# Patient Record
Sex: FEMALE | Race: WHITE | Employment: OTHER | URBAN - METROPOLITAN AREA
[De-identification: names, ages, dates, MRNs, and addresses within clinical notes are randomized per-mention and may not be internally consistent; named-entity substitution may affect disease eponyms.]

---

## 2017-06-09 ENCOUNTER — NURSE ONLY (OUTPATIENT)
Dept: INTERNAL MEDICINE CLINIC | Age: 82
End: 2017-06-09

## 2017-06-09 ENCOUNTER — TELEPHONE (OUTPATIENT)
Dept: INTERNAL MEDICINE CLINIC | Age: 82
End: 2017-06-09

## 2017-06-09 DIAGNOSIS — Z00.00 ROUTINE GENERAL MEDICAL EXAMINATION AT A HEALTH CARE FACILITY: ICD-10-CM

## 2017-06-09 DIAGNOSIS — I10 ESSENTIAL HYPERTENSION: ICD-10-CM

## 2017-06-09 DIAGNOSIS — Z00.00 ROUTINE GENERAL MEDICAL EXAMINATION AT A HEALTH CARE FACILITY: Primary | ICD-10-CM

## 2017-06-09 DIAGNOSIS — E78.5 HYPERLIPIDEMIA, UNSPECIFIED HYPERLIPIDEMIA TYPE: ICD-10-CM

## 2017-06-09 LAB
A/G RATIO: 2 (ref 1.1–2.2)
ALBUMIN SERPL-MCNC: 4.4 G/DL (ref 3.4–5)
ALP BLD-CCNC: 95 U/L (ref 40–129)
ALT SERPL-CCNC: 12 U/L (ref 10–40)
ANION GAP SERPL CALCULATED.3IONS-SCNC: 13 MMOL/L (ref 3–16)
AST SERPL-CCNC: 27 U/L (ref 15–37)
BILIRUB SERPL-MCNC: 0.8 MG/DL (ref 0–1)
BUN BLDV-MCNC: 22 MG/DL (ref 7–20)
CALCIUM SERPL-MCNC: 9.9 MG/DL (ref 8.3–10.6)
CHLORIDE BLD-SCNC: 101 MMOL/L (ref 99–110)
CHOLESTEROL, TOTAL: 152 MG/DL (ref 0–199)
CO2: 27 MMOL/L (ref 21–32)
CREAT SERPL-MCNC: 0.6 MG/DL (ref 0.6–1.2)
GFR AFRICAN AMERICAN: >60
GFR NON-AFRICAN AMERICAN: >60
GLOBULIN: 2.2 G/DL
GLUCOSE BLD-MCNC: 100 MG/DL (ref 70–99)
HCT VFR BLD CALC: 42.9 % (ref 36–48)
HDLC SERPL-MCNC: 68 MG/DL (ref 40–60)
HEMOGLOBIN: 14.2 G/DL (ref 12–16)
LDL CHOLESTEROL CALCULATED: 68 MG/DL
MCH RBC QN AUTO: 30 PG (ref 26–34)
MCHC RBC AUTO-ENTMCNC: 33.1 G/DL (ref 31–36)
MCV RBC AUTO: 90.8 FL (ref 80–100)
PDW BLD-RTO: 13.2 % (ref 12.4–15.4)
PLATELET # BLD: 183 K/UL (ref 135–450)
PMV BLD AUTO: 9.7 FL (ref 5–10.5)
POTASSIUM SERPL-SCNC: 4 MMOL/L (ref 3.5–5.1)
RBC # BLD: 4.73 M/UL (ref 4–5.2)
SODIUM BLD-SCNC: 141 MMOL/L (ref 136–145)
TOTAL PROTEIN: 6.6 G/DL (ref 6.4–8.2)
TRIGL SERPL-MCNC: 82 MG/DL (ref 0–150)
TSH SERPL DL<=0.05 MIU/L-ACNC: 1.93 UIU/ML (ref 0.27–4.2)
VLDLC SERPL CALC-MCNC: 16 MG/DL
WBC # BLD: 6 K/UL (ref 4–11)

## 2017-06-09 PROCEDURE — 36415 COLL VENOUS BLD VENIPUNCTURE: CPT | Performed by: INTERNAL MEDICINE

## 2017-06-12 ENCOUNTER — OFFICE VISIT (OUTPATIENT)
Dept: INTERNAL MEDICINE CLINIC | Age: 82
End: 2017-06-12

## 2017-06-12 VITALS
WEIGHT: 128 LBS | SYSTOLIC BLOOD PRESSURE: 138 MMHG | TEMPERATURE: 97.6 F | DIASTOLIC BLOOD PRESSURE: 80 MMHG | HEART RATE: 68 BPM | BODY MASS INDEX: 25 KG/M2

## 2017-06-12 DIAGNOSIS — E04.9 GOITER: ICD-10-CM

## 2017-06-12 DIAGNOSIS — I10 ESSENTIAL HYPERTENSION: ICD-10-CM

## 2017-06-12 DIAGNOSIS — E78.00 PURE HYPERCHOLESTEROLEMIA: Primary | ICD-10-CM

## 2017-06-12 PROCEDURE — 1090F PRES/ABSN URINE INCON ASSESS: CPT | Performed by: INTERNAL MEDICINE

## 2017-06-12 PROCEDURE — G8427 DOCREV CUR MEDS BY ELIG CLIN: HCPCS | Performed by: INTERNAL MEDICINE

## 2017-06-12 PROCEDURE — G8400 PT W/DXA NO RESULTS DOC: HCPCS | Performed by: INTERNAL MEDICINE

## 2017-06-12 PROCEDURE — 99214 OFFICE O/P EST MOD 30 MIN: CPT | Performed by: INTERNAL MEDICINE

## 2017-06-12 PROCEDURE — 1036F TOBACCO NON-USER: CPT | Performed by: INTERNAL MEDICINE

## 2017-06-12 PROCEDURE — G8419 CALC BMI OUT NRM PARAM NOF/U: HCPCS | Performed by: INTERNAL MEDICINE

## 2017-06-12 PROCEDURE — 1123F ACP DISCUSS/DSCN MKR DOCD: CPT | Performed by: INTERNAL MEDICINE

## 2017-06-12 PROCEDURE — 4040F PNEUMOC VAC/ADMIN/RCVD: CPT | Performed by: INTERNAL MEDICINE

## 2017-06-12 RX ORDER — ATORVASTATIN CALCIUM 20 MG/1
20 TABLET, FILM COATED ORAL DAILY
Qty: 30 TABLET | Refills: 11 | Status: SHIPPED | OUTPATIENT
Start: 2017-06-12 | End: 2018-06-11 | Stop reason: SDUPTHER

## 2017-06-12 RX ORDER — LISINOPRIL AND HYDROCHLOROTHIAZIDE 25; 20 MG/1; MG/1
1 TABLET ORAL DAILY
Qty: 90 TABLET | Refills: 3 | Status: SHIPPED | OUTPATIENT
Start: 2017-06-12 | End: 2018-06-11 | Stop reason: SDUPTHER

## 2017-06-12 ASSESSMENT — ENCOUNTER SYMPTOMS
COLOR CHANGE: 0
STRIDOR: 0
EYES NEGATIVE: 1
CHOKING: 0
RESPIRATORY NEGATIVE: 1
SINUS PRESSURE: 0
APNEA: 0
BACK PAIN: 0

## 2017-10-20 ENCOUNTER — NURSE ONLY (OUTPATIENT)
Dept: INTERNAL MEDICINE CLINIC | Age: 82
End: 2017-10-20

## 2017-10-20 DIAGNOSIS — Z23 NEED FOR INFLUENZA VACCINATION: Primary | ICD-10-CM

## 2017-10-20 PROCEDURE — G0008 ADMIN INFLUENZA VIRUS VAC: HCPCS | Performed by: INTERNAL MEDICINE

## 2017-10-20 PROCEDURE — 90662 IIV NO PRSV INCREASED AG IM: CPT | Performed by: INTERNAL MEDICINE

## 2018-06-08 ENCOUNTER — TELEPHONE (OUTPATIENT)
Dept: INTERNAL MEDICINE CLINIC | Age: 83
End: 2018-06-08

## 2018-06-08 ENCOUNTER — NURSE ONLY (OUTPATIENT)
Dept: INTERNAL MEDICINE CLINIC | Age: 83
End: 2018-06-08

## 2018-06-08 DIAGNOSIS — I10 ESSENTIAL HYPERTENSION: ICD-10-CM

## 2018-06-08 DIAGNOSIS — E78.00 PURE HYPERCHOLESTEROLEMIA: Primary | ICD-10-CM

## 2018-06-08 LAB
A/G RATIO: 1.7 (ref 1.1–2.2)
ALBUMIN SERPL-MCNC: 4.1 G/DL (ref 3.4–5)
ALP BLD-CCNC: 99 U/L (ref 40–129)
ALT SERPL-CCNC: 9 U/L (ref 10–40)
ANION GAP SERPL CALCULATED.3IONS-SCNC: 12 MMOL/L (ref 3–16)
AST SERPL-CCNC: 21 U/L (ref 15–37)
BILIRUB SERPL-MCNC: 0.5 MG/DL (ref 0–1)
BUN BLDV-MCNC: 19 MG/DL (ref 7–20)
CALCIUM SERPL-MCNC: 10 MG/DL (ref 8.3–10.6)
CHLORIDE BLD-SCNC: 103 MMOL/L (ref 99–110)
CHOLESTEROL, TOTAL: 152 MG/DL (ref 0–199)
CO2: 27 MMOL/L (ref 21–32)
CREAT SERPL-MCNC: 0.6 MG/DL (ref 0.6–1.2)
GFR AFRICAN AMERICAN: >60
GFR NON-AFRICAN AMERICAN: >60
GLOBULIN: 2.4 G/DL
GLUCOSE BLD-MCNC: 96 MG/DL (ref 70–99)
HCT VFR BLD CALC: 41.7 % (ref 36–48)
HDLC SERPL-MCNC: 69 MG/DL (ref 40–60)
HEMOGLOBIN: 13.9 G/DL (ref 12–16)
LDL CHOLESTEROL CALCULATED: 68 MG/DL
MCH RBC QN AUTO: 30.7 PG (ref 26–34)
MCHC RBC AUTO-ENTMCNC: 33.3 G/DL (ref 31–36)
MCV RBC AUTO: 92.4 FL (ref 80–100)
PDW BLD-RTO: 13.7 % (ref 12.4–15.4)
PLATELET # BLD: 217 K/UL (ref 135–450)
PMV BLD AUTO: 9.3 FL (ref 5–10.5)
POTASSIUM SERPL-SCNC: 3.9 MMOL/L (ref 3.5–5.1)
RBC # BLD: 4.52 M/UL (ref 4–5.2)
SODIUM BLD-SCNC: 142 MMOL/L (ref 136–145)
TOTAL PROTEIN: 6.5 G/DL (ref 6.4–8.2)
TRIGL SERPL-MCNC: 76 MG/DL (ref 0–150)
TSH SERPL DL<=0.05 MIU/L-ACNC: 2.08 UIU/ML (ref 0.27–4.2)
VLDLC SERPL CALC-MCNC: 15 MG/DL
WBC # BLD: 6.4 K/UL (ref 4–11)

## 2018-06-08 PROCEDURE — 36415 COLL VENOUS BLD VENIPUNCTURE: CPT | Performed by: INTERNAL MEDICINE

## 2018-06-11 ENCOUNTER — OFFICE VISIT (OUTPATIENT)
Dept: INTERNAL MEDICINE CLINIC | Age: 83
End: 2018-06-11

## 2018-06-11 VITALS
BODY MASS INDEX: 25.52 KG/M2 | HEART RATE: 66 BPM | TEMPERATURE: 98.3 F | SYSTOLIC BLOOD PRESSURE: 122 MMHG | HEIGHT: 60 IN | WEIGHT: 130 LBS | DIASTOLIC BLOOD PRESSURE: 80 MMHG

## 2018-06-11 DIAGNOSIS — I10 ESSENTIAL HYPERTENSION: Primary | ICD-10-CM

## 2018-06-11 DIAGNOSIS — E78.00 PURE HYPERCHOLESTEROLEMIA: ICD-10-CM

## 2018-06-11 PROCEDURE — 1123F ACP DISCUSS/DSCN MKR DOCD: CPT | Performed by: INTERNAL MEDICINE

## 2018-06-11 PROCEDURE — 99214 OFFICE O/P EST MOD 30 MIN: CPT | Performed by: INTERNAL MEDICINE

## 2018-06-11 PROCEDURE — 1090F PRES/ABSN URINE INCON ASSESS: CPT | Performed by: INTERNAL MEDICINE

## 2018-06-11 PROCEDURE — G8400 PT W/DXA NO RESULTS DOC: HCPCS | Performed by: INTERNAL MEDICINE

## 2018-06-11 PROCEDURE — G8427 DOCREV CUR MEDS BY ELIG CLIN: HCPCS | Performed by: INTERNAL MEDICINE

## 2018-06-11 PROCEDURE — G8417 CALC BMI ABV UP PARAM F/U: HCPCS | Performed by: INTERNAL MEDICINE

## 2018-06-11 PROCEDURE — 1036F TOBACCO NON-USER: CPT | Performed by: INTERNAL MEDICINE

## 2018-06-11 PROCEDURE — 4040F PNEUMOC VAC/ADMIN/RCVD: CPT | Performed by: INTERNAL MEDICINE

## 2018-06-11 RX ORDER — ATORVASTATIN CALCIUM 20 MG/1
20 TABLET, FILM COATED ORAL DAILY
Qty: 90 TABLET | Refills: 3 | Status: SHIPPED | OUTPATIENT
Start: 2018-06-11 | End: 2019-05-28 | Stop reason: SDUPTHER

## 2018-06-11 RX ORDER — LISINOPRIL AND HYDROCHLOROTHIAZIDE 25; 20 MG/1; MG/1
1 TABLET ORAL DAILY
Qty: 90 TABLET | Refills: 3 | Status: SHIPPED | OUTPATIENT
Start: 2018-06-11 | End: 2019-05-28 | Stop reason: SDUPTHER

## 2018-06-11 ASSESSMENT — ENCOUNTER SYMPTOMS
GASTROINTESTINAL NEGATIVE: 1
RESPIRATORY NEGATIVE: 1

## 2018-06-11 ASSESSMENT — PATIENT HEALTH QUESTIONNAIRE - PHQ9
2. FEELING DOWN, DEPRESSED OR HOPELESS: 0
1. LITTLE INTEREST OR PLEASURE IN DOING THINGS: 0
SUM OF ALL RESPONSES TO PHQ9 QUESTIONS 1 & 2: 0
SUM OF ALL RESPONSES TO PHQ QUESTIONS 1-9: 0

## 2018-10-23 ENCOUNTER — IMMUNIZATION (OUTPATIENT)
Dept: INTERNAL MEDICINE CLINIC | Age: 83
End: 2018-10-23
Payer: MEDICARE

## 2018-10-23 DIAGNOSIS — Z23 NEED FOR INFLUENZA VACCINATION: Primary | ICD-10-CM

## 2018-10-23 PROCEDURE — 90662 IIV NO PRSV INCREASED AG IM: CPT | Performed by: INTERNAL MEDICINE

## 2018-10-23 PROCEDURE — G0008 ADMIN INFLUENZA VIRUS VAC: HCPCS | Performed by: INTERNAL MEDICINE

## 2018-12-03 ENCOUNTER — TELEPHONE (OUTPATIENT)
Dept: INTERNAL MEDICINE CLINIC | Age: 83
End: 2018-12-03

## 2018-12-04 ENCOUNTER — OFFICE VISIT (OUTPATIENT)
Dept: INTERNAL MEDICINE CLINIC | Age: 83
End: 2018-12-04
Payer: MEDICARE

## 2018-12-04 VITALS
HEART RATE: 92 BPM | DIASTOLIC BLOOD PRESSURE: 80 MMHG | BODY MASS INDEX: 25.97 KG/M2 | SYSTOLIC BLOOD PRESSURE: 122 MMHG | WEIGHT: 133 LBS | TEMPERATURE: 98.3 F | OXYGEN SATURATION: 96 %

## 2018-12-04 DIAGNOSIS — J06.9 ACUTE URI: Primary | ICD-10-CM

## 2018-12-04 PROCEDURE — 1036F TOBACCO NON-USER: CPT | Performed by: INTERNAL MEDICINE

## 2018-12-04 PROCEDURE — G8482 FLU IMMUNIZE ORDER/ADMIN: HCPCS | Performed by: INTERNAL MEDICINE

## 2018-12-04 PROCEDURE — 99213 OFFICE O/P EST LOW 20 MIN: CPT | Performed by: INTERNAL MEDICINE

## 2018-12-04 PROCEDURE — 1090F PRES/ABSN URINE INCON ASSESS: CPT | Performed by: INTERNAL MEDICINE

## 2018-12-04 PROCEDURE — 4040F PNEUMOC VAC/ADMIN/RCVD: CPT | Performed by: INTERNAL MEDICINE

## 2018-12-04 PROCEDURE — G8427 DOCREV CUR MEDS BY ELIG CLIN: HCPCS | Performed by: INTERNAL MEDICINE

## 2018-12-04 PROCEDURE — 1123F ACP DISCUSS/DSCN MKR DOCD: CPT | Performed by: INTERNAL MEDICINE

## 2018-12-04 PROCEDURE — 1101F PT FALLS ASSESS-DOCD LE1/YR: CPT | Performed by: INTERNAL MEDICINE

## 2018-12-04 PROCEDURE — G8400 PT W/DXA NO RESULTS DOC: HCPCS | Performed by: INTERNAL MEDICINE

## 2018-12-04 PROCEDURE — G8417 CALC BMI ABV UP PARAM F/U: HCPCS | Performed by: INTERNAL MEDICINE

## 2018-12-04 RX ORDER — LEVOFLOXACIN 250 MG/1
250 TABLET ORAL DAILY
Qty: 7 TABLET | Refills: 0 | Status: SHIPPED | OUTPATIENT
Start: 2018-12-04 | End: 2018-12-11

## 2018-12-04 RX ORDER — GUAIFENESIN AND CODEINE PHOSPHATE 100; 10 MG/5ML; MG/5ML
10 SOLUTION ORAL 3 TIMES DAILY PRN
Qty: 120 ML | Refills: 0 | Status: SHIPPED | OUTPATIENT
Start: 2018-12-04 | End: 2018-12-11

## 2018-12-04 ASSESSMENT — ENCOUNTER SYMPTOMS
WHEEZING: 0
SHORTNESS OF BREATH: 0
RHINORRHEA: 1
VOICE CHANGE: 1
COUGH: 1
GASTROINTESTINAL NEGATIVE: 1

## 2018-12-04 NOTE — PATIENT INSTRUCTIONS
Please call your pharmacy if you need any refills of your medication(s). Please call our office at [phone] if you don't hear from us about your test results. Bring an accurate list of your medications with you at every appointment to ensure that we have the correct information.     Our office hours are: Monday - Friday 8:30 am- 5 pm    Phone lines turn on at 8:30 am

## 2018-12-04 NOTE — PROGRESS NOTES
Smoker                                                              Smokeless tobacco: Never Used                      Alcohol use: No                  Cough   This is a new problem. The current episode started 1 to 4 weeks ago. The problem has been gradually worsening. The problem occurs constantly. The cough is productive of brown sputum. Associated symptoms include myalgias and rhinorrhea. Pertinent negatives include no chills, ear pain, shortness of breath or wheezing. Nothing aggravates the symptoms. She has tried OTC cough suppressant for the symptoms. The treatment provided no relief. There is no history of asthma or pneumonia. Review of Systems   Constitutional: Negative for chills. HENT: Positive for rhinorrhea and voice change. Negative for ear pain. Respiratory: Positive for cough. Negative for shortness of breath and wheezing. Cardiovascular: Negative. Gastrointestinal: Negative. Genitourinary: Positive for frequency. Musculoskeletal: Positive for myalgias. Skin: Negative. Neurological:        Facial pain. Psychiatric/Behavioral: Positive for sleep disturbance. Objective:   Physical Exam   Constitutional: She is oriented to person, place, and time. She appears well-developed and well-nourished. No distress. HENT:   Head: Normocephalic and atraumatic. Right Ear: External ear normal.   Left Ear: External ear normal.   Nose: Nose normal.   Mouth/Throat: Oropharynx is clear and moist. No oropharyngeal exudate. Rt side enlarged nodule thyroid. Eyes: Pupils are equal, round, and reactive to light. Conjunctivae and EOM are normal. Right eye exhibits no discharge. Left eye exhibits no discharge. No scleral icterus. Neck: Normal range of motion. Neck supple. No JVD present. No tracheal deviation present. No thyromegaly present. Cardiovascular: Normal rate, regular rhythm, normal heart sounds and intact distal pulses. Exam reveals no gallop and no friction rub.     No murmur heard. Systolic heart murmur. Pulmonary/Chest: Effort normal and breath sounds normal. No stridor. No respiratory distress. She has no wheezes. She has no rales. She exhibits no tenderness. Abdominal: Soft. She exhibits no distension and no mass. There is no tenderness. There is no rebound and no guarding. Genitourinary: Vagina normal. Rectal exam shows guaiac negative stool. No vaginal discharge found. Musculoskeletal: Normal range of motion. She exhibits no edema or tenderness. Lymphadenopathy:     She has no cervical adenopathy. Neurological: She is alert and oriented to person, place, and time. She has normal reflexes. She displays normal reflexes. No cranial nerve deficit. She exhibits normal muscle tone. Coordination normal.   Skin: Skin is warm and dry. No rash noted. She is not diaphoretic. No erythema. No pallor. Psychiatric: She has a normal mood and affect. Her behavior is normal. Judgment and thought content normal.       Assessment:      Encounter Diagnosis   Name Primary?  Acute URI Yes           Plan:      Esteban Nj was seen today for cough. Diagnoses and all orders for this visit:    Acute URI  -     levofloxacin (LEVAQUIN) 250 MG tablet; Take 1 tablet by mouth daily for 7 days  -     guaiFENesin-codeine (TUSSI-ORGANIDIN NR) 100-10 MG/5ML syrup; Take 10 mLs by mouth 3 times daily as needed for Cough for up to 7 days. Jean Sherman MD

## 2019-05-28 ENCOUNTER — TELEPHONE (OUTPATIENT)
Dept: INTERNAL MEDICINE CLINIC | Age: 84
End: 2019-05-28

## 2019-05-28 DIAGNOSIS — I10 ESSENTIAL HYPERTENSION: ICD-10-CM

## 2019-05-28 DIAGNOSIS — E78.00 PURE HYPERCHOLESTEROLEMIA: ICD-10-CM

## 2019-05-28 RX ORDER — LISINOPRIL AND HYDROCHLOROTHIAZIDE 25; 20 MG/1; MG/1
1 TABLET ORAL DAILY
Qty: 90 TABLET | Refills: 3 | Status: SHIPPED | OUTPATIENT
Start: 2019-05-28 | End: 2019-06-13 | Stop reason: SDUPTHER

## 2019-05-28 RX ORDER — ATORVASTATIN CALCIUM 20 MG/1
20 TABLET, FILM COATED ORAL DAILY
Qty: 90 TABLET | Refills: 3 | Status: SHIPPED | OUTPATIENT
Start: 2019-05-28 | End: 2019-06-13 | Stop reason: SDUPTHER

## 2019-06-10 ENCOUNTER — TELEPHONE (OUTPATIENT)
Dept: INTERNAL MEDICINE CLINIC | Age: 84
End: 2019-06-10

## 2019-06-10 DIAGNOSIS — E78.00 PURE HYPERCHOLESTEROLEMIA: Primary | ICD-10-CM

## 2019-06-10 DIAGNOSIS — Z00.00 ANNUAL PHYSICAL EXAM: ICD-10-CM

## 2019-06-10 DIAGNOSIS — E78.00 PURE HYPERCHOLESTEROLEMIA: ICD-10-CM

## 2019-06-10 DIAGNOSIS — I10 ESSENTIAL HYPERTENSION: ICD-10-CM

## 2019-06-10 LAB
A/G RATIO: 1.5 (ref 1.1–2.2)
ALBUMIN SERPL-MCNC: 4.3 G/DL (ref 3.4–5)
ALP BLD-CCNC: 109 U/L (ref 40–129)
ALT SERPL-CCNC: 8 U/L (ref 10–40)
ANION GAP SERPL CALCULATED.3IONS-SCNC: 10 MMOL/L (ref 3–16)
AST SERPL-CCNC: 22 U/L (ref 15–37)
BILIRUB SERPL-MCNC: 0.7 MG/DL (ref 0–1)
BUN BLDV-MCNC: 20 MG/DL (ref 7–20)
CALCIUM SERPL-MCNC: 10.3 MG/DL (ref 8.3–10.6)
CHLORIDE BLD-SCNC: 101 MMOL/L (ref 99–110)
CHOLESTEROL, TOTAL: 154 MG/DL (ref 0–199)
CO2: 29 MMOL/L (ref 21–32)
CREAT SERPL-MCNC: 0.6 MG/DL (ref 0.6–1.2)
GFR AFRICAN AMERICAN: >60
GFR NON-AFRICAN AMERICAN: >60
GLOBULIN: 2.8 G/DL
GLUCOSE BLD-MCNC: 104 MG/DL (ref 70–99)
HCT VFR BLD CALC: 45.5 % (ref 36–48)
HDLC SERPL-MCNC: 64 MG/DL (ref 40–60)
HEMOGLOBIN: 15 G/DL (ref 12–16)
LDL CHOLESTEROL CALCULATED: 74 MG/DL
MCH RBC QN AUTO: 30 PG (ref 26–34)
MCHC RBC AUTO-ENTMCNC: 33 G/DL (ref 31–36)
MCV RBC AUTO: 91 FL (ref 80–100)
PDW BLD-RTO: 13.4 % (ref 12.4–15.4)
PLATELET # BLD: 205 K/UL (ref 135–450)
PMV BLD AUTO: 9.7 FL (ref 5–10.5)
POTASSIUM SERPL-SCNC: 4.4 MMOL/L (ref 3.5–5.1)
RBC # BLD: 5 M/UL (ref 4–5.2)
SODIUM BLD-SCNC: 140 MMOL/L (ref 136–145)
TOTAL PROTEIN: 7.1 G/DL (ref 6.4–8.2)
TRIGL SERPL-MCNC: 82 MG/DL (ref 0–150)
TSH SERPL DL<=0.05 MIU/L-ACNC: 2.82 UIU/ML (ref 0.27–4.2)
VLDLC SERPL CALC-MCNC: 16 MG/DL
WBC # BLD: 6.7 K/UL (ref 4–11)

## 2019-06-13 ENCOUNTER — OFFICE VISIT (OUTPATIENT)
Dept: INTERNAL MEDICINE CLINIC | Age: 84
End: 2019-06-13
Payer: MEDICARE

## 2019-06-13 VITALS
OXYGEN SATURATION: 96 % | SYSTOLIC BLOOD PRESSURE: 132 MMHG | WEIGHT: 135.25 LBS | BODY MASS INDEX: 26.55 KG/M2 | HEART RATE: 84 BPM | DIASTOLIC BLOOD PRESSURE: 82 MMHG | TEMPERATURE: 98 F | HEIGHT: 60 IN

## 2019-06-13 DIAGNOSIS — E78.00 PURE HYPERCHOLESTEROLEMIA: Primary | ICD-10-CM

## 2019-06-13 DIAGNOSIS — I10 ESSENTIAL HYPERTENSION: ICD-10-CM

## 2019-06-13 DIAGNOSIS — Z00.00 ANNUAL PHYSICAL EXAM: ICD-10-CM

## 2019-06-13 PROCEDURE — 4040F PNEUMOC VAC/ADMIN/RCVD: CPT | Performed by: INTERNAL MEDICINE

## 2019-06-13 PROCEDURE — 1090F PRES/ABSN URINE INCON ASSESS: CPT | Performed by: INTERNAL MEDICINE

## 2019-06-13 PROCEDURE — 1036F TOBACCO NON-USER: CPT | Performed by: INTERNAL MEDICINE

## 2019-06-13 PROCEDURE — G8400 PT W/DXA NO RESULTS DOC: HCPCS | Performed by: INTERNAL MEDICINE

## 2019-06-13 PROCEDURE — 1123F ACP DISCUSS/DSCN MKR DOCD: CPT | Performed by: INTERNAL MEDICINE

## 2019-06-13 PROCEDURE — 99214 OFFICE O/P EST MOD 30 MIN: CPT | Performed by: INTERNAL MEDICINE

## 2019-06-13 PROCEDURE — G8427 DOCREV CUR MEDS BY ELIG CLIN: HCPCS | Performed by: INTERNAL MEDICINE

## 2019-06-13 PROCEDURE — G8417 CALC BMI ABV UP PARAM F/U: HCPCS | Performed by: INTERNAL MEDICINE

## 2019-06-13 RX ORDER — LISINOPRIL AND HYDROCHLOROTHIAZIDE 25; 20 MG/1; MG/1
1 TABLET ORAL DAILY
Qty: 90 TABLET | Refills: 3 | Status: SHIPPED | OUTPATIENT
Start: 2019-06-13 | End: 2020-06-01 | Stop reason: SDUPTHER

## 2019-06-13 RX ORDER — ATORVASTATIN CALCIUM 20 MG/1
20 TABLET, FILM COATED ORAL DAILY
Qty: 90 TABLET | Refills: 3 | Status: SHIPPED | OUTPATIENT
Start: 2019-06-13 | End: 2020-06-01 | Stop reason: SDUPTHER

## 2019-06-13 ASSESSMENT — ENCOUNTER SYMPTOMS
STRIDOR: 0
GASTROINTESTINAL NEGATIVE: 1
EYES NEGATIVE: 1
SINUS PRESSURE: 0
CHOKING: 0
BACK PAIN: 0
RESPIRATORY NEGATIVE: 1
APNEA: 0
COLOR CHANGE: 0

## 2019-06-13 ASSESSMENT — PATIENT HEALTH QUESTIONNAIRE - PHQ9
SUM OF ALL RESPONSES TO PHQ QUESTIONS 1-9: 0
SUM OF ALL RESPONSES TO PHQ QUESTIONS 1-9: 0
SUM OF ALL RESPONSES TO PHQ9 QUESTIONS 1 & 2: 0
1. LITTLE INTEREST OR PLEASURE IN DOING THINGS: 0
2. FEELING DOWN, DEPRESSED OR HOPELESS: 0

## 2019-06-13 NOTE — PATIENT INSTRUCTIONS
Please call your pharmacy if you need any refills of your medication(s). Please call our office at [phone] if you don't hear from us about your test results. Bring an accurate list of your medications with you at every appointment to ensure that we have the correct information.     Our office hours are: Monday - Friday 8:30 am- 5 pm    Phone lines turn on at 8:30 am

## 2019-06-13 NOTE — PROGRESS NOTES
Subjective:      Patient ID: Myranda Wright is a 82 y.o. female. Patient presents with: hypertension and hyperlipidemia. Annual Exam: yearly check up , discuss labs. refill atorvastatin to [facility]. Myranda Wright is a 82 y.o. female with the following history as recorded in [facility]:  Patient Active Problem List    UGI bleed         Date Noted: 09/19/2014      Hyperlipidemia         Date Noted: 01/04/2012      HTN (hypertension)         Date Noted: 01/04/2012      Current Outpatient Medications:  atorvastatin (LIPITOR) 20 MG tablet, Take 1 tablet by mouth daily, Disp: 90 tablet, Rfl: 3  lisinopril-hydrochlorothiazide (PRINZIDE;ZESTORETIC) 20-25 MG per tablet, Take 1 tablet by mouth daily, Disp: 90 tablet, Rfl: 3  acetaminophen (TYLENOL) 325 MG tablet, Take 650 mg by mouth every 6 hours as needed for Pain (knees). , Disp: , Rfl:     No current facility-administered medications for this visit. Allergies: Aspirin; Demerol hcl (meperidine); and Sulfa antibiotics  Past Medical History:  9/2014: Anemia  No date: Bleeding stomach ulcer  9/2014: Hiatal hernia      Comment:  small  No date: Hyperlipidemia  No date: Hypertension  No date: Inguinal hernia      Comment:  no surgery yet.   No date: Kidney calculus  9/2014: Sigmoid diverticulosis  Past Surgical History:  age 33'mayuri: APPENDECTOMY  age 33'mayuri: HYSTERECTOMY  child: TONSILLECTOMY AND ADENOIDECTOMY  9/2014: UPPER GASTROINTESTINAL ENDOSCOPY      Comment:  bleeding stomach ulcer  Review of patient's family history indicates:  Problem: Heart Disease      Relation: Mother          Age of Onset: (Not Specified)  Problem: Diabetes      Relation: Mother          Age of Onset: (Not Specified)  Problem: Cancer      Relation: Mother          Age of Onset: (Not Specified)  Problem: Heart Disease      Relation: Father          Age of Onset: (Not Specified)  Problem: Breast Cancer      Relation: Sister          Age of Onset: (Not Specified)    Social History Tobacco Use      Smoking status: Never Smoker      Smokeless tobacco: Never Used    Alcohol use: No      Hypertension   This is a chronic problem. The current episode started more than 1 year ago. The problem is controlled. There are no associated agents to hypertension. Past treatments include ACE inhibitors and diuretics. The current treatment provides significant improvement. There are no compliance problems. There is no history of chronic renal disease or sleep apnea. Review of Systems   Constitutional: Negative. Negative for fatigue. HENT: Negative. Negative for ear pain, hearing loss and sinus pressure. Eyes: Negative. Negative for visual disturbance. Respiratory: Negative. Negative for apnea, choking and stridor. Cardiovascular: Negative. Gastrointestinal: Negative. Genitourinary: Negative. Negative for hematuria. Musculoskeletal: Positive for arthralgias. Negative for back pain. Knee pain. Skin: Negative. Negative for color change and pallor. Neurological: Negative. Negative for dizziness, tremors, seizures and syncope. Hematological: Does not bruise/bleed easily. Psychiatric/Behavioral: Negative. Negative for confusion, decreased concentration and dysphoric mood. Objective:   Physical Exam    Assessment:      Encounter Diagnoses   Name Primary?  Pure hypercholesterolemia Yes    Essential hypertension     Annual physical exam            Plan:      Tamiko Rodsa was seen today for annual exam.    Diagnoses and all orders for this visit:    Pure hypercholesterolemia  -     atorvastatin (LIPITOR) 20 MG tablet; Take 1 tablet by mouth daily    Essential hypertension  -     lisinopril-hydrochlorothiazide (PRINZIDE;ZESTORETIC) 20-25 MG per tablet;  Take 1 tablet by mouth daily    Annual physical exam                Karen Mathis MD

## 2019-10-03 ENCOUNTER — NURSE ONLY (OUTPATIENT)
Dept: INTERNAL MEDICINE CLINIC | Age: 84
End: 2019-10-03
Payer: MEDICARE

## 2019-10-03 DIAGNOSIS — Z23 NEED FOR INFLUENZA VACCINATION: Primary | ICD-10-CM

## 2019-10-03 PROCEDURE — G0008 ADMIN INFLUENZA VIRUS VAC: HCPCS | Performed by: INTERNAL MEDICINE

## 2019-10-03 PROCEDURE — 90653 IIV ADJUVANT VACCINE IM: CPT | Performed by: INTERNAL MEDICINE

## 2020-06-01 ENCOUNTER — VIRTUAL VISIT (OUTPATIENT)
Dept: FAMILY MEDICINE CLINIC | Age: 85
End: 2020-06-01

## 2020-06-01 VITALS — WEIGHT: 134 LBS | BODY MASS INDEX: 26.17 KG/M2

## 2020-06-01 PROCEDURE — 99442 PR PHYS/QHP TELEPHONE EVALUATION 11-20 MIN: CPT | Performed by: INTERNAL MEDICINE

## 2020-06-01 RX ORDER — LISINOPRIL AND HYDROCHLOROTHIAZIDE 25; 20 MG/1; MG/1
1 TABLET ORAL DAILY
Qty: 90 TABLET | Refills: 3 | Status: SHIPPED | OUTPATIENT
Start: 2020-06-01 | End: 2021-05-19 | Stop reason: SDUPTHER

## 2020-06-01 RX ORDER — ATORVASTATIN CALCIUM 20 MG/1
20 TABLET, FILM COATED ORAL DAILY
Qty: 90 TABLET | Refills: 3 | Status: SHIPPED | OUTPATIENT
Start: 2020-06-01 | End: 2021-05-19 | Stop reason: SDUPTHER

## 2020-06-01 ASSESSMENT — PATIENT HEALTH QUESTIONNAIRE - PHQ9
1. LITTLE INTEREST OR PLEASURE IN DOING THINGS: 0
SUM OF ALL RESPONSES TO PHQ QUESTIONS 1-9: 0
SUM OF ALL RESPONSES TO PHQ9 QUESTIONS 1 & 2: 0
2. FEELING DOWN, DEPRESSED OR HOPELESS: 0
SUM OF ALL RESPONSES TO PHQ QUESTIONS 1-9: 0

## 2020-06-01 ASSESSMENT — ENCOUNTER SYMPTOMS
RESPIRATORY NEGATIVE: 1
NAUSEA: 0

## 2020-06-01 NOTE — PROGRESS NOTES
History    Tobacco Use      Smoking status: Never Smoker      Smokeless tobacco: Never Used    Alcohol use: No  Arturo Nice A Minor is a 82 y.o. female evaluated via telephone on 6/1/2020. Consent:  She and/or health care decision maker is aware that that she may receive a bill for this telephone service, depending on her insurance coverage, and has provided verbal consent to proceed: Yes      Documentation:  I communicated with the patient and/or health care decision maker about annual check up. .   Details of this discussion including any medical advice provided: by me. I affirm this is a Patient Initiated Episode with a Patient who has not had a related appointment within my department in the past 7 days or scheduled within the next 24 hours. Patient identification was verified at the start of the visit: Yes    Total Time: minutes: 11-20 minutes    Note: not billable if this call serves to triage the patient into an appointment for the relevant concern      [employer] Overall         Review of Systems   Constitutional: Negative for chills and fever. HENT: Positive for sneezing. Eyes: Negative for visual disturbance. Respiratory: Negative. Cardiovascular: Negative. Gastrointestinal: Negative for nausea. Genitourinary: Negative. Negative for frequency. Musculoskeletal: Negative. Skin: Negative. Neurological: Negative. Hematological: Negative. Psychiatric/Behavioral: The patient is nervous/anxious. Objective:   Physical Exam    Assessment:      Encounter Diagnoses   Name Primary?  Pure hypercholesterolemia     Essential hypertension            Plan:      Arturo Nice was seen today for annual exam.    Diagnoses and all orders for this visit:    Pure hypercholesterolemia  -     atorvastatin (LIPITOR) 20 MG tablet; Take 1 tablet by mouth daily  -     Comprehensive Metabolic Panel; Future  -     CBC; Future  -     Lipid Panel; Future  -     TSH without Reflex;  Future    Essential hypertension  -     lisinopril-hydroCHLOROthiazide (PRINZIDE;ZESTORETIC) 20-25 MG per tablet; Take 1 tablet by mouth daily  -     Comprehensive Metabolic Panel; Future  -     CBC; Future  -     TSH without Reflex;  Future  -     Priscilla Torres MD

## 2020-06-02 DIAGNOSIS — I10 ESSENTIAL HYPERTENSION: ICD-10-CM

## 2020-06-02 DIAGNOSIS — E78.00 PURE HYPERCHOLESTEROLEMIA: ICD-10-CM

## 2020-06-02 LAB
A/G RATIO: 2 (ref 1.1–2.2)
ALBUMIN SERPL-MCNC: 4.3 G/DL (ref 3.4–5)
ALP BLD-CCNC: 97 U/L (ref 40–129)
ALT SERPL-CCNC: 10 U/L (ref 10–40)
ANION GAP SERPL CALCULATED.3IONS-SCNC: 14 MMOL/L (ref 3–16)
AST SERPL-CCNC: 23 U/L (ref 15–37)
BILIRUB SERPL-MCNC: 0.7 MG/DL (ref 0–1)
BILIRUBIN URINE: NEGATIVE
BLOOD, URINE: ABNORMAL
BUN BLDV-MCNC: 21 MG/DL (ref 7–20)
CALCIUM SERPL-MCNC: 9.6 MG/DL (ref 8.3–10.6)
CHLORIDE BLD-SCNC: 100 MMOL/L (ref 99–110)
CHOLESTEROL, TOTAL: 163 MG/DL (ref 0–199)
CLARITY: CLEAR
CO2: 24 MMOL/L (ref 21–32)
COLOR: YELLOW
CREAT SERPL-MCNC: 0.6 MG/DL (ref 0.6–1.2)
EPITHELIAL CELLS, UA: 0 /HPF (ref 0–5)
GFR AFRICAN AMERICAN: >60
GFR NON-AFRICAN AMERICAN: >60
GLOBULIN: 2.2 G/DL
GLUCOSE BLD-MCNC: 99 MG/DL (ref 70–99)
GLUCOSE URINE: NEGATIVE MG/DL
HCT VFR BLD CALC: 44.4 % (ref 36–48)
HDLC SERPL-MCNC: 66 MG/DL (ref 40–60)
HEMOGLOBIN: 14.7 G/DL (ref 12–16)
HYALINE CASTS: 0 /LPF (ref 0–8)
KETONES, URINE: NEGATIVE MG/DL
LDL CHOLESTEROL CALCULATED: 79 MG/DL
LEUKOCYTE ESTERASE, URINE: NEGATIVE
MCH RBC QN AUTO: 30.3 PG (ref 26–34)
MCHC RBC AUTO-ENTMCNC: 33 G/DL (ref 31–36)
MCV RBC AUTO: 91.8 FL (ref 80–100)
MICROSCOPIC EXAMINATION: YES
NITRITE, URINE: NEGATIVE
PDW BLD-RTO: 13.5 % (ref 12.4–15.4)
PH UA: 6 (ref 5–8)
PLATELET # BLD: 201 K/UL (ref 135–450)
PMV BLD AUTO: 9.6 FL (ref 5–10.5)
POTASSIUM SERPL-SCNC: 3.7 MMOL/L (ref 3.5–5.1)
PROTEIN UA: NEGATIVE MG/DL
RBC # BLD: 4.84 M/UL (ref 4–5.2)
RBC UA: 3 /HPF (ref 0–4)
SODIUM BLD-SCNC: 138 MMOL/L (ref 136–145)
SPECIFIC GRAVITY UA: 1.02 (ref 1–1.03)
TOTAL PROTEIN: 6.5 G/DL (ref 6.4–8.2)
TRIGL SERPL-MCNC: 89 MG/DL (ref 0–150)
TSH SERPL DL<=0.05 MIU/L-ACNC: 3.53 UIU/ML (ref 0.27–4.2)
URINE TYPE: ABNORMAL
UROBILINOGEN, URINE: 0.2 E.U./DL
VLDLC SERPL CALC-MCNC: 18 MG/DL
WBC # BLD: 6 K/UL (ref 4–11)
WBC UA: 1 /HPF (ref 0–5)

## 2020-10-14 ENCOUNTER — IMMUNIZATION (OUTPATIENT)
Dept: FAMILY MEDICINE CLINIC | Age: 85
End: 2020-10-14
Payer: MEDICARE

## 2020-10-14 PROCEDURE — 90694 VACC AIIV4 NO PRSRV 0.5ML IM: CPT | Performed by: INTERNAL MEDICINE

## 2020-10-14 PROCEDURE — G0008 ADMIN INFLUENZA VIRUS VAC: HCPCS | Performed by: INTERNAL MEDICINE

## 2021-01-21 ENCOUNTER — IMMUNIZATION (OUTPATIENT)
Dept: PRIMARY CARE CLINIC | Age: 86
End: 2021-01-21
Payer: MEDICARE

## 2021-01-21 PROCEDURE — 91300 COVID-19, PFIZER VACCINE 30MCG/0.3ML DOSE: CPT | Performed by: NURSE PRACTITIONER

## 2021-01-21 PROCEDURE — 0001A PR IMM ADMN SARSCOV2 30MCG/0.3ML DIL RECON 1ST DOSE: CPT | Performed by: NURSE PRACTITIONER

## 2021-02-11 ENCOUNTER — IMMUNIZATION (OUTPATIENT)
Dept: PRIMARY CARE CLINIC | Age: 86
End: 2021-02-11
Payer: MEDICARE

## 2021-02-11 PROCEDURE — 0002A COVID-19, PFIZER VACCINE 30MCG/0.3ML DOSE: CPT | Performed by: FAMILY MEDICINE

## 2021-02-11 PROCEDURE — 91300 COVID-19, PFIZER VACCINE 30MCG/0.3ML DOSE: CPT | Performed by: FAMILY MEDICINE

## 2021-03-24 ENCOUNTER — TELEPHONE (OUTPATIENT)
Dept: FAMILY MEDICINE CLINIC | Age: 86
End: 2021-03-24

## 2021-03-24 NOTE — TELEPHONE ENCOUNTER
----- Message from Hillard Habermann sent at 3/24/2021 11:52 AM EDT -----  Subject: Appointment Request    Reason for Call: New Patient Request Appointment    QUESTIONS  Type of Appointment? Established Patient  Reason for appointment request? No appointments available during search  Additional Information for Provider? Patient needing to book to be   established for a new PCP before Dr Kerry Salmeron leaves and needing two refills for   current meds. ---------------------------------------------------------------------------  --------------  Qamar HOLGUIN  What is the best way for the office to contact you? OK to leave message on   voicemail  Preferred Call Back Phone Number? [phone]  ---------------------------------------------------------------------------  --------------  SCRIPT ANSWERS  Relationship to Patient? Self  Appointment reason? Establish Care/Find a provider  Is this the first appointment to establish care for a ? No  Have you been diagnosed with   tested for   or told that you are suspected of having COVID-19 (Coronavirus)? No  Have you had a fever or taken medication to treat a fever within the past   3 days? No  Have you had a cough   shortness of breath or flu-like symptoms within the past 3 days? No  Do you currently have flu-like symptoms including fever or chills   cough   shortness of breath   or difficulty breathing   or new loss of taste or smell? No  (Service Expert  click yes below to proceed with [employer] As Usual   Scheduling)?  Yes

## 2021-05-19 DIAGNOSIS — I10 ESSENTIAL HYPERTENSION: ICD-10-CM

## 2021-05-19 DIAGNOSIS — E78.00 PURE HYPERCHOLESTEROLEMIA: ICD-10-CM

## 2021-05-19 LAB
ANION GAP SERPL CALCULATED.3IONS-SCNC: 11 MMOL/L (ref 3–16)
BUN BLDV-MCNC: 21 MG/DL (ref 7–20)
CALCIUM SERPL-MCNC: 10.4 MG/DL (ref 8.3–10.6)
CHLORIDE BLD-SCNC: 101 MMOL/L (ref 99–110)
CHOLESTEROL, TOTAL: 164 MG/DL (ref 0–199)
CO2: 27 MMOL/L (ref 21–32)
CREAT SERPL-MCNC: 0.6 MG/DL (ref 0.6–1.2)
GFR AFRICAN AMERICAN: >60
GFR NON-AFRICAN AMERICAN: >60
GLUCOSE BLD-MCNC: 98 MG/DL (ref 70–99)
HDLC SERPL-MCNC: 63 MG/DL (ref 40–60)
LDL CHOLESTEROL CALCULATED: 78 MG/DL
POTASSIUM SERPL-SCNC: 4 MMOL/L (ref 3.5–5.1)
SODIUM BLD-SCNC: 139 MMOL/L (ref 136–145)
TRIGL SERPL-MCNC: 114 MG/DL (ref 0–150)
VLDLC SERPL CALC-MCNC: 23 MG/DL

## 2021-12-20 ENCOUNTER — HOSPITAL ENCOUNTER (OUTPATIENT)
Dept: WOMENS IMAGING | Age: 86
Discharge: HOME | End: 2021-12-20
Payer: MEDICARE

## 2021-12-20 DIAGNOSIS — Z13.820 SCREENING FOR OSTEOPOROSIS: ICD-10-CM

## 2021-12-20 DIAGNOSIS — M81.0 AGE-RELATED OSTEOPOROSIS WITHOUT CURRENT PATHOLOGICAL FRACTURE: ICD-10-CM

## 2021-12-20 DIAGNOSIS — M40.04 POSTURAL KYPHOSIS OF THORACIC REGION: ICD-10-CM

## 2021-12-20 PROCEDURE — 77080 DXA BONE DENSITY AXIAL: CPT

## 2022-01-03 ENCOUNTER — APPOINTMENT (OUTPATIENT)
Dept: CT IMAGING | Age: 87
DRG: 282 | End: 2022-01-03
Payer: MEDICARE

## 2022-01-03 ENCOUNTER — HOSPITAL ENCOUNTER (INPATIENT)
Age: 87
LOS: 2 days | Discharge: HOME | DRG: 282 | End: 2022-01-05
Attending: EMERGENCY MEDICINE
Payer: MEDICARE

## 2022-01-03 ENCOUNTER — APPOINTMENT (OUTPATIENT)
Dept: GENERAL RADIOLOGY | Age: 87
DRG: 282 | End: 2022-01-03
Payer: MEDICARE

## 2022-01-03 DIAGNOSIS — I21.4 NSTEMI (NON-ST ELEVATED MYOCARDIAL INFARCTION) (HCC): ICD-10-CM

## 2022-01-03 DIAGNOSIS — E83.42 HYPOMAGNESEMIA: ICD-10-CM

## 2022-01-03 DIAGNOSIS — I20.0 UNSTABLE ANGINA PECTORIS (HCC): Primary | ICD-10-CM

## 2022-01-03 DIAGNOSIS — I10 PRIMARY HYPERTENSION: ICD-10-CM

## 2022-01-03 PROBLEM — R79.89 ELEVATED TROPONIN: Status: ACTIVE | Noted: 2022-01-03

## 2022-01-03 PROBLEM — E07.9 THYROID MASS: Status: ACTIVE | Noted: 2022-01-03

## 2022-01-03 PROBLEM — R77.8 ELEVATED TROPONIN: Status: ACTIVE | Noted: 2022-01-03

## 2022-01-03 PROBLEM — R07.9 CHEST PAIN: Status: ACTIVE | Noted: 2022-01-03

## 2022-01-03 LAB
A/G RATIO: 1.9 (ref 1.1–2.2)
ALBUMIN SERPL-MCNC: 4.2 G/DL (ref 3.4–5)
ALP BLD-CCNC: 114 U/L (ref 40–129)
ALT SERPL-CCNC: 9 U/L (ref 10–40)
ANION GAP SERPL CALCULATED.3IONS-SCNC: 10 MMOL/L (ref 3–16)
AST SERPL-CCNC: 23 U/L (ref 15–37)
BASOPHILS ABSOLUTE: 0 K/UL (ref 0–0.2)
BASOPHILS RELATIVE PERCENT: 0.7 %
BILIRUB SERPL-MCNC: <0.2 MG/DL (ref 0–1)
BUN BLDV-MCNC: 31 MG/DL (ref 7–20)
CALCIUM SERPL-MCNC: 9.8 MG/DL (ref 8.3–10.6)
CHLORIDE BLD-SCNC: 106 MMOL/L (ref 99–110)
CO2: 26 MMOL/L (ref 21–32)
CREAT SERPL-MCNC: 1 MG/DL (ref 0.6–1.2)
EOSINOPHILS ABSOLUTE: 0.1 K/UL (ref 0–0.6)
EOSINOPHILS RELATIVE PERCENT: 1.2 %
GFR AFRICAN AMERICAN: >60
GFR NON-AFRICAN AMERICAN: 53
GLUCOSE BLD-MCNC: 133 MG/DL (ref 70–99)
HCT VFR BLD CALC: 42.7 % (ref 36–48)
HEMOGLOBIN: 14 G/DL (ref 12–16)
LIPASE: 34 U/L (ref 13–60)
LYMPHOCYTES ABSOLUTE: 1.9 K/UL (ref 1–5.1)
LYMPHOCYTES RELATIVE PERCENT: 26.5 %
MAGNESIUM: 1.5 MG/DL (ref 1.8–2.4)
MCH RBC QN AUTO: 29.7 PG (ref 26–34)
MCHC RBC AUTO-ENTMCNC: 32.9 G/DL (ref 31–36)
MCV RBC AUTO: 90.2 FL (ref 80–100)
MONOCYTES ABSOLUTE: 0.6 K/UL (ref 0–1.3)
MONOCYTES RELATIVE PERCENT: 8.8 %
NEUTROPHILS ABSOLUTE: 4.4 K/UL (ref 1.7–7.7)
NEUTROPHILS RELATIVE PERCENT: 62.8 %
PDW BLD-RTO: 12.4 % (ref 12.4–15.4)
PLATELET # BLD: 209 K/UL (ref 135–450)
PMV BLD AUTO: 8.9 FL (ref 5–10.5)
POTASSIUM REFLEX MAGNESIUM: 3.5 MMOL/L (ref 3.5–5.1)
RBC # BLD: 4.73 M/UL (ref 4–5.2)
SODIUM BLD-SCNC: 142 MMOL/L (ref 136–145)
TOTAL PROTEIN: 6.4 G/DL (ref 6.4–8.2)
TROPONIN: 0.04 NG/ML
TROPONIN: <0.01 NG/ML
WBC # BLD: 7 K/UL (ref 4–11)

## 2022-01-03 PROCEDURE — 6360000004 HC RX CONTRAST MEDICATION: Performed by: EMERGENCY MEDICINE

## 2022-01-03 PROCEDURE — 93005 ELECTROCARDIOGRAM TRACING: CPT | Performed by: EMERGENCY MEDICINE

## 2022-01-03 PROCEDURE — 85025 COMPLETE CBC W/AUTO DIFF WBC: CPT

## 2022-01-03 PROCEDURE — 71046 X-RAY EXAM CHEST 2 VIEWS: CPT

## 2022-01-03 PROCEDURE — 85730 THROMBOPLASTIN TIME PARTIAL: CPT

## 2022-01-03 PROCEDURE — 83690 ASSAY OF LIPASE: CPT

## 2022-01-03 PROCEDURE — 99285 EMERGENCY DEPT VISIT HI MDM: CPT

## 2022-01-03 PROCEDURE — 84443 ASSAY THYROID STIM HORMONE: CPT

## 2022-01-03 PROCEDURE — 6360000002 HC RX W HCPCS: Performed by: EMERGENCY MEDICINE

## 2022-01-03 PROCEDURE — 84484 ASSAY OF TROPONIN QUANT: CPT

## 2022-01-03 PROCEDURE — 80053 COMPREHEN METABOLIC PANEL: CPT

## 2022-01-03 PROCEDURE — 1200000000 HC SEMI PRIVATE

## 2022-01-03 PROCEDURE — 6370000000 HC RX 637 (ALT 250 FOR IP): Performed by: EMERGENCY MEDICINE

## 2022-01-03 PROCEDURE — 83735 ASSAY OF MAGNESIUM: CPT

## 2022-01-03 PROCEDURE — 36415 COLL VENOUS BLD VENIPUNCTURE: CPT

## 2022-01-03 PROCEDURE — 74174 CTA ABD&PLVS W/CONTRAST: CPT

## 2022-01-03 RX ORDER — HEPARIN SODIUM 1000 [USP'U]/ML
60 INJECTION, SOLUTION INTRAVENOUS; SUBCUTANEOUS PRN
Status: DISCONTINUED | OUTPATIENT
Start: 2022-01-03 | End: 2022-01-03

## 2022-01-03 RX ORDER — HEPARIN SODIUM 1000 [USP'U]/ML
3400 INJECTION, SOLUTION INTRAVENOUS; SUBCUTANEOUS ONCE
Status: COMPLETED | OUTPATIENT
Start: 2022-01-03 | End: 2022-01-03

## 2022-01-03 RX ORDER — HEPARIN SODIUM 1000 [USP'U]/ML
30 INJECTION, SOLUTION INTRAVENOUS; SUBCUTANEOUS PRN
Status: DISCONTINUED | OUTPATIENT
Start: 2022-01-03 | End: 2022-01-03

## 2022-01-03 RX ORDER — HEPARIN SODIUM AND DEXTROSE 10000; 5 [USP'U]/100ML; G/100ML
0-3000 INJECTION INTRAVENOUS CONTINUOUS
Status: DISCONTINUED | OUTPATIENT
Start: 2022-01-03 | End: 2022-01-04

## 2022-01-03 RX ORDER — CLOPIDOGREL BISULFATE 75 MG/1
300 TABLET ORAL ONCE
Status: COMPLETED | OUTPATIENT
Start: 2022-01-03 | End: 2022-01-03

## 2022-01-03 RX ADMIN — HEPARIN SODIUM 3400 UNITS: 1000 INJECTION INTRAVENOUS; SUBCUTANEOUS at 23:31

## 2022-01-03 RX ADMIN — CLOPIDOGREL BISULFATE 300 MG: 75 TABLET ORAL at 23:00

## 2022-01-03 RX ADMIN — IOPAMIDOL 100 ML: 755 INJECTION, SOLUTION INTRAVENOUS at 20:38

## 2022-01-03 RX ADMIN — HEPARIN SODIUM 680 UNITS/HR: 10000 INJECTION, SOLUTION INTRAVENOUS at 23:36

## 2022-01-03 ASSESSMENT — ENCOUNTER SYMPTOMS
NAUSEA: 0
SORE THROAT: 0
SHORTNESS OF BREATH: 0
VOMITING: 0
DIARRHEA: 0
ABDOMINAL DISTENTION: 0
RESPIRATORY NEGATIVE: 1

## 2022-01-03 ASSESSMENT — PAIN SCALES - GENERAL
PAINLEVEL_OUTOF10: 0

## 2022-01-04 PROBLEM — I21.4 NSTEMI (NON-ST ELEVATED MYOCARDIAL INFARCTION) (HCC): Status: ACTIVE | Noted: 2022-01-04

## 2022-01-04 LAB
APTT: 120.1 SEC (ref 26.2–38.6)
APTT: 32.1 SEC (ref 26.2–38.6)
CHOLESTEROL, TOTAL: 134 MG/DL (ref 0–199)
EKG ATRIAL RATE: 67 BPM
EKG ATRIAL RATE: 75 BPM
EKG ATRIAL RATE: 80 BPM
EKG ATRIAL RATE: 92 BPM
EKG DIAGNOSIS: NORMAL
EKG P AXIS: 16 DEGREES
EKG P AXIS: 27 DEGREES
EKG P AXIS: 31 DEGREES
EKG P AXIS: 32 DEGREES
EKG P-R INTERVAL: 126 MS
EKG P-R INTERVAL: 132 MS
EKG P-R INTERVAL: 132 MS
EKG P-R INTERVAL: 134 MS
EKG Q-T INTERVAL: 368 MS
EKG Q-T INTERVAL: 380 MS
EKG Q-T INTERVAL: 404 MS
EKG Q-T INTERVAL: 434 MS
EKG QRS DURATION: 78 MS
EKG QRS DURATION: 84 MS
EKG QRS DURATION: 86 MS
EKG QRS DURATION: 88 MS
EKG QTC CALCULATION (BAZETT): 438 MS
EKG QTC CALCULATION (BAZETT): 451 MS
EKG QTC CALCULATION (BAZETT): 455 MS
EKG QTC CALCULATION (BAZETT): 458 MS
EKG R AXIS: -21 DEGREES
EKG R AXIS: -27 DEGREES
EKG R AXIS: -29 DEGREES
EKG R AXIS: -29 DEGREES
EKG T AXIS: 26 DEGREES
EKG T AXIS: 34 DEGREES
EKG T AXIS: 36 DEGREES
EKG T AXIS: 44 DEGREES
EKG VENTRICULAR RATE: 67 BPM
EKG VENTRICULAR RATE: 75 BPM
EKG VENTRICULAR RATE: 80 BPM
EKG VENTRICULAR RATE: 92 BPM
HCT VFR BLD CALC: 40.2 % (ref 36–48)
HDLC SERPL-MCNC: 57 MG/DL (ref 40–60)
HEMOGLOBIN: 13.3 G/DL (ref 12–16)
LDL CHOLESTEROL CALCULATED: 65 MG/DL
LV EF: 68 %
LVEF MODALITY: NORMAL
MAGNESIUM: 1.7 MG/DL (ref 1.8–2.4)
MCH RBC QN AUTO: 30.1 PG (ref 26–34)
MCHC RBC AUTO-ENTMCNC: 33 G/DL (ref 31–36)
MCV RBC AUTO: 91.1 FL (ref 80–100)
PDW BLD-RTO: 13.1 % (ref 12.4–15.4)
PLATELET # BLD: 189 K/UL (ref 135–450)
PMV BLD AUTO: 8.8 FL (ref 5–10.5)
RBC # BLD: 4.41 M/UL (ref 4–5.2)
TRIGL SERPL-MCNC: 60 MG/DL (ref 0–150)
TROPONIN: 0.03 NG/ML
TROPONIN: 0.04 NG/ML
TSH REFLEX FT4: 2.7 UIU/ML (ref 0.27–4.2)
VLDLC SERPL CALC-MCNC: 12 MG/DL
WBC # BLD: 5.9 K/UL (ref 4–11)

## 2022-01-04 PROCEDURE — 93010 ELECTROCARDIOGRAM REPORT: CPT | Performed by: INTERNAL MEDICINE

## 2022-01-04 PROCEDURE — 93005 ELECTROCARDIOGRAM TRACING: CPT | Performed by: INTERNAL MEDICINE

## 2022-01-04 PROCEDURE — 6370000000 HC RX 637 (ALT 250 FOR IP): Performed by: INTERNAL MEDICINE

## 2022-01-04 PROCEDURE — 6360000004 HC RX CONTRAST MEDICATION: Performed by: STUDENT IN AN ORGANIZED HEALTH CARE EDUCATION/TRAINING PROGRAM

## 2022-01-04 PROCEDURE — 2500000003 HC RX 250 WO HCPCS

## 2022-01-04 PROCEDURE — 99152 MOD SED SAME PHYS/QHP 5/>YRS: CPT

## 2022-01-04 PROCEDURE — 94761 N-INVAS EAR/PLS OXIMETRY MLT: CPT

## 2022-01-04 PROCEDURE — 97161 PT EVAL LOW COMPLEX 20 MIN: CPT

## 2022-01-04 PROCEDURE — 93454 CORONARY ARTERY ANGIO S&I: CPT

## 2022-01-04 PROCEDURE — 6360000002 HC RX W HCPCS: Performed by: STUDENT IN AN ORGANIZED HEALTH CARE EDUCATION/TRAINING PROGRAM

## 2022-01-04 PROCEDURE — 2580000003 HC RX 258: Performed by: INTERNAL MEDICINE

## 2022-01-04 PROCEDURE — 1200000000 HC SEMI PRIVATE

## 2022-01-04 PROCEDURE — 6360000002 HC RX W HCPCS

## 2022-01-04 PROCEDURE — 93454 CORONARY ARTERY ANGIO S&I: CPT | Performed by: INTERNAL MEDICINE

## 2022-01-04 PROCEDURE — 85027 COMPLETE CBC AUTOMATED: CPT

## 2022-01-04 PROCEDURE — 80061 LIPID PANEL: CPT

## 2022-01-04 PROCEDURE — 85730 THROMBOPLASTIN TIME PARTIAL: CPT

## 2022-01-04 PROCEDURE — C1769 GUIDE WIRE: HCPCS

## 2022-01-04 PROCEDURE — 36415 COLL VENOUS BLD VENIPUNCTURE: CPT

## 2022-01-04 PROCEDURE — 99153 MOD SED SAME PHYS/QHP EA: CPT

## 2022-01-04 PROCEDURE — 99152 MOD SED SAME PHYS/QHP 5/>YRS: CPT | Performed by: INTERNAL MEDICINE

## 2022-01-04 PROCEDURE — 97530 THERAPEUTIC ACTIVITIES: CPT

## 2022-01-04 PROCEDURE — 99222 1ST HOSP IP/OBS MODERATE 55: CPT | Performed by: STUDENT IN AN ORGANIZED HEALTH CARE EDUCATION/TRAINING PROGRAM

## 2022-01-04 PROCEDURE — C1894 INTRO/SHEATH, NON-LASER: HCPCS

## 2022-01-04 PROCEDURE — 93306 TTE W/DOPPLER COMPLETE: CPT

## 2022-01-04 PROCEDURE — 2709999900 HC NON-CHARGEABLE SUPPLY

## 2022-01-04 PROCEDURE — 6360000002 HC RX W HCPCS: Performed by: EMERGENCY MEDICINE

## 2022-01-04 PROCEDURE — 84484 ASSAY OF TROPONIN QUANT: CPT

## 2022-01-04 PROCEDURE — 83735 ASSAY OF MAGNESIUM: CPT

## 2022-01-04 PROCEDURE — B2111ZZ FLUOROSCOPY OF MULTIPLE CORONARY ARTERIES USING LOW OSMOLAR CONTRAST: ICD-10-PCS | Performed by: INTERNAL MEDICINE

## 2022-01-04 RX ORDER — SODIUM CHLORIDE 0.9 % (FLUSH) 0.9 %
5-40 SYRINGE (ML) INJECTION EVERY 12 HOURS SCHEDULED
Status: DISCONTINUED | OUTPATIENT
Start: 2022-01-04 | End: 2022-01-05 | Stop reason: HOSPADM

## 2022-01-04 RX ORDER — SODIUM CHLORIDE 9 MG/ML
25 INJECTION, SOLUTION INTRAVENOUS PRN
Status: DISCONTINUED | OUTPATIENT
Start: 2022-01-04 | End: 2022-01-05 | Stop reason: HOSPADM

## 2022-01-04 RX ORDER — ACETAMINOPHEN 325 MG/1
650 TABLET ORAL EVERY 6 HOURS PRN
Status: DISCONTINUED | OUTPATIENT
Start: 2022-01-04 | End: 2022-01-05 | Stop reason: HOSPADM

## 2022-01-04 RX ORDER — SODIUM CHLORIDE 0.9 % (FLUSH) 0.9 %
5-40 SYRINGE (ML) INJECTION EVERY 12 HOURS SCHEDULED
Status: DISCONTINUED | OUTPATIENT
Start: 2022-01-04 | End: 2022-01-04

## 2022-01-04 RX ORDER — MAGNESIUM SULFATE IN WATER 40 MG/ML
2000 INJECTION, SOLUTION INTRAVENOUS ONCE
Status: COMPLETED | OUTPATIENT
Start: 2022-01-04 | End: 2022-01-04

## 2022-01-04 RX ORDER — SODIUM CHLORIDE 9 MG/ML
INJECTION, SOLUTION INTRAVENOUS CONTINUOUS
Status: DISCONTINUED | OUTPATIENT
Start: 2022-01-04 | End: 2022-01-04

## 2022-01-04 RX ORDER — MAGNESIUM SULFATE 1 G/100ML
1000 INJECTION INTRAVENOUS PRN
Status: DISCONTINUED | OUTPATIENT
Start: 2022-01-04 | End: 2022-01-05 | Stop reason: HOSPADM

## 2022-01-04 RX ORDER — CLOPIDOGREL BISULFATE 75 MG/1
75 TABLET ORAL DAILY
Status: DISCONTINUED | OUTPATIENT
Start: 2022-01-04 | End: 2022-01-05 | Stop reason: HOSPADM

## 2022-01-04 RX ORDER — LISINOPRIL AND HYDROCHLOROTHIAZIDE 12.5; 1 MG/1; MG/1
2 TABLET ORAL DAILY
Status: DISCONTINUED | OUTPATIENT
Start: 2022-01-04 | End: 2022-01-05 | Stop reason: HOSPADM

## 2022-01-04 RX ORDER — PANTOPRAZOLE SODIUM 40 MG/1
40 TABLET, DELAYED RELEASE ORAL
Status: DISCONTINUED | OUTPATIENT
Start: 2022-01-05 | End: 2022-01-05 | Stop reason: HOSPADM

## 2022-01-04 RX ORDER — ACETAMINOPHEN 650 MG/1
650 SUPPOSITORY RECTAL EVERY 6 HOURS PRN
Status: DISCONTINUED | OUTPATIENT
Start: 2022-01-04 | End: 2022-01-05 | Stop reason: HOSPADM

## 2022-01-04 RX ORDER — SODIUM CHLORIDE 0.9 % (FLUSH) 0.9 %
5-40 SYRINGE (ML) INJECTION PRN
Status: DISCONTINUED | OUTPATIENT
Start: 2022-01-04 | End: 2022-01-05

## 2022-01-04 RX ORDER — ATORVASTATIN CALCIUM 20 MG/1
20 TABLET, FILM COATED ORAL DAILY
Status: DISCONTINUED | OUTPATIENT
Start: 2022-01-04 | End: 2022-01-05 | Stop reason: HOSPADM

## 2022-01-04 RX ORDER — SODIUM CHLORIDE 9 MG/ML
INJECTION, SOLUTION INTRAVENOUS CONTINUOUS
Status: DISCONTINUED | OUTPATIENT
Start: 2022-01-04 | End: 2022-01-05 | Stop reason: HOSPADM

## 2022-01-04 RX ORDER — SODIUM CHLORIDE 0.9 % (FLUSH) 0.9 %
5-40 SYRINGE (ML) INJECTION PRN
Status: DISCONTINUED | OUTPATIENT
Start: 2022-01-04 | End: 2022-01-05 | Stop reason: HOSPADM

## 2022-01-04 RX ORDER — POLYETHYLENE GLYCOL 3350 17 G/17G
17 POWDER, FOR SOLUTION ORAL DAILY PRN
Status: DISCONTINUED | OUTPATIENT
Start: 2022-01-04 | End: 2022-01-05 | Stop reason: HOSPADM

## 2022-01-04 RX ORDER — ACETAMINOPHEN 325 MG/1
650 TABLET ORAL EVERY 4 HOURS PRN
Status: DISCONTINUED | OUTPATIENT
Start: 2022-01-04 | End: 2022-01-05 | Stop reason: HOSPADM

## 2022-01-04 RX ORDER — ONDANSETRON 2 MG/ML
4 INJECTION INTRAMUSCULAR; INTRAVENOUS EVERY 6 HOURS PRN
Status: DISCONTINUED | OUTPATIENT
Start: 2022-01-04 | End: 2022-01-05 | Stop reason: HOSPADM

## 2022-01-04 RX ORDER — ONDANSETRON 4 MG/1
4 TABLET, ORALLY DISINTEGRATING ORAL EVERY 8 HOURS PRN
Status: DISCONTINUED | OUTPATIENT
Start: 2022-01-04 | End: 2022-01-05 | Stop reason: HOSPADM

## 2022-01-04 RX ADMIN — SODIUM CHLORIDE: 9 INJECTION, SOLUTION INTRAVENOUS at 07:19

## 2022-01-04 RX ADMIN — LISINOPRIL AND HYDROCHLOROTHIAZIDE 2 TABLET: 12.5; 1 TABLET ORAL at 08:34

## 2022-01-04 RX ADMIN — HEPARIN SODIUM 510 UNITS/HR: 10000 INJECTION, SOLUTION INTRAVENOUS at 08:29

## 2022-01-04 RX ADMIN — SODIUM CHLORIDE, PRESERVATIVE FREE 10 ML: 5 INJECTION INTRAVENOUS at 20:14

## 2022-01-04 RX ADMIN — CLOPIDOGREL BISULFATE 75 MG: 75 TABLET ORAL at 08:34

## 2022-01-04 RX ADMIN — MAGNESIUM SULFATE HEPTAHYDRATE 2000 MG: 40 INJECTION, SOLUTION INTRAVENOUS at 08:30

## 2022-01-04 RX ADMIN — SODIUM CHLORIDE: 9 INJECTION, SOLUTION INTRAVENOUS at 11:30

## 2022-01-04 RX ADMIN — IOPAMIDOL 50 ML: 755 INJECTION, SOLUTION INTRAVENOUS at 16:34

## 2022-01-04 RX ADMIN — ATORVASTATIN CALCIUM 20 MG: 20 TABLET, FILM COATED ORAL at 08:34

## 2022-01-04 RX ADMIN — SODIUM CHLORIDE: 9 INJECTION, SOLUTION INTRAVENOUS at 02:14

## 2022-01-04 ASSESSMENT — PAIN SCALES - GENERAL
PAINLEVEL_OUTOF10: 0

## 2022-01-04 ASSESSMENT — PAIN SCALES - WONG BAKER: WONGBAKER_NUMERICALRESPONSE: 0

## 2022-01-04 NOTE — ED NOTES
Report called to braydon mendoza @ [facility] room 1247.  Report given to [facility] transport team.      Travon Edgar RN  01/04/22 9305

## 2022-01-04 NOTE — FLOWSHEET NOTE
Heparin gtt restarted at 510 units/hr  Electronically signed by Lisbeth Parmar RN on 1/4/2022 at 8:37 AM

## 2022-01-04 NOTE — FLOWSHEET NOTE
Pt prepped for cardiac cath, consent signed and placed in soft chart. NPO. Patent PIV x2. Ambulatory. Family updated by pt. Pt off unit on stretcher.    Electronically signed by Anca Florentino RN on 1/4/2022 at 1:30 PM

## 2022-01-04 NOTE — PROGRESS NOTES
Patient arrived to 0 from [place] [address] ED via stretcher and with heparin gtt infusing at 680 units/hr. Patient is A/O x 4, VSS and denies any complaints of pain at this time. Skin assessment completed. Please see 4 eyes skin note and skin documentation. Patient oriented to room, call light and hourly rounding. Bed locked in lowest position, non-skid socks are on and call light/belongings are within each. Patient instructed to use call light to call out for assistance as needed. Will review orders and continue to monitor.      Cici Guzman RN 1/4/2022 2:29 AM

## 2022-01-04 NOTE — PRE SEDATION
Sedation Pre-Procedure Note    Patient Name: Zoila Burris   YOB: 1935  Room/Bed: N2F-0577/4353-66  Medical Record Number: [id number]  Date: 1/4/2022   Time: 4:43 PM       Indication:  NSTEMI    Consent: I have discussed with the patient and/or the patient representative the indication, alternatives, and the possible risks and/or complications of the planned procedure and the anesthesia methods. The patient and/or patient representative appear to understand and agree to proceed. Vital Signs:   Vitals:    01/04/22 1230   BP: 124/72   Pulse: 69   Resp: 16   Temp: 98.3 °F (36.8 °C)   SpO2: 95%       Past Medical History:   has a past medical history of Anemia, Bleeding stomach ulcer, Hiatal hernia, Hyperlipidemia, Hypertension, Inguinal hernia, Kidney calculus, and Sigmoid diverticulosis. Past Surgical History:   has a past surgical history that includes Hysterectomy (age 33'mayuri); Appendectomy (age 33'mayuri); Tonsillectomy and adenoidectomy (child); and Upper gastrointestinal endoscopy (9/2014). Medications:   Scheduled Meds:    atorvastatin  20 mg Oral Daily    lisinopril-hydroCHLOROthiazide  2 tablet Oral Daily    sodium chloride flush  5-40 mL IntraVENous 2 times per day    clopidogrel  75 mg Oral Daily    sodium chloride flush  5-40 mL IntraVENous 2 times per day    [START ON 1/5/2022] pantoprazole  40 mg Oral QAM AC     Continuous Infusions:    sodium chloride      sodium chloride 50 mL/hr at 01/04/22 1130    sodium chloride       PRN Meds: sodium chloride flush, sodium chloride, ondansetron **OR** ondansetron, acetaminophen **OR** acetaminophen, polyethylene glycol, magnesium sulfate, sodium chloride flush, sodium chloride, iopamidol  Home Meds:   Prior to Admission medications    Medication Sig Start Date End Date Taking?  Authorizing Provider   lisinopril-hydroCHLOROthiazide (PRINZIDE;ZESTORETIC) 20-25 MG per tablet Take 1 tablet by mouth daily 5/19/21  Yes Ana Lara MD atorvastatin (LIPITOR) 20 MG tablet Take 1 tablet by mouth daily 5/19/21  Yes Gera Smith MD   vitamin D (ERGOCALCIFEROL) 1.25 MG (54298 UT) CAPS capsule Take 1 capsule by mouth once a week 12/28/21   Gera Smith MD     Coumadin Use Last 7 Days:  no  Antiplatelet drug therapy use last 7 days: no  Other anticoagulant use last 7 days: no  Additional Medication Information:  n/a      Pre-Sedation Documentation and Exam:   I have personally completed a history, physical exam & review of systems for this patient (see notes).     Mallampati Airway Assessment:  Mallampati Class I - (soft palate, fauces, uvula & anterior/posterior tonsillar pillars are visible)    Prior History of Anesthesia Complications:   none    ASA Classification:  Class 3 - A patient with severe systemic disease that limits activity but is not incapacitating    Sedation/ Anesthesia Plan:   intravenous sedation    Medications Planned:   midazolam (Versed) intravenously    Patient is an appropriate candidate for plan of sedation: yes    Electronically signed by Anyi Sanchez MD on 1/4/2022 at 4:43 PM

## 2022-01-04 NOTE — CONSULTS
Clinical Pharmacy Note  Heparin Dosing       No results found for: APTT  Lab Results   Component Value Date    HGB 14.0 01/03/2022    HCT 42.7 01/03/2022     01/03/2022    INR 0.97 09/19/2014       Plan:  Initial bolus: 3400 units  Initial infusion rate: 680 units/hr  Next aPTT: 0600 1/4/22    Pharmacy will continue to monitor and adjust based on aPTT results.   Vanessa Henley, PharmD

## 2022-01-04 NOTE — ED NOTES
Repeat ekg done per order Dr. Arnoldo Snell. Pt updated on plan of care. Denies any cp/ just states left arm feels \"different\" only.  Warm blankets applied for comfort/ family @ bedside     Susie De La Cruz RN  01/03/22 1939

## 2022-01-04 NOTE — ED PROVIDER NOTES
[facility]  EMERGENCY DEPARTMENTENCOUNTER      Pt Name: Anu Naik  MRN: [id number]  [place] 1935   Date ofevaluation: 1/3/2022  Provider: Evangelina Viera MD    CHIEF COMPLAINT       Chief Complaint   Patient presents with    Palpitations     REPORTS HAVING PALPITATIONS ONSET 30 MINS PTA STRANGE FEELING LEFT ARM AND RIGHT EAR/NECK AREA         HISTORY OF PRESENT ILLNESS   (Location/Symptom, Timing/Onset,Context/Setting, Quality, Duration, Modifying Factors, Severity)  Note limiting factors. Anu Naik is a 82 y.o. female  who  has a past medical history of Anemia, Bleeding stomach ulcer, Hiatal hernia, Hyperlipidemia, Hypertension, Inguinal hernia, Kidney calculus, and Sigmoid diverticulosis. 82-year-old female who presents primarily for palpitations which occurred approximately 30 to 45 minutes prior to arrival.  Sudden onset. Constant and unchanging. Associated with a tightness in her chest that radiates to her left arm and her right neck. Has never had this sensation before. Nothing seems to make it better or worse. Not worse with exertion not better with rest.  She was sitting still when her symptoms started. Denies shortness of breath, fever, chills, nausea, vomiting, diarrhea, dysuria, hematuria, back pain, numbness, weakness. Pain is not tearing or ripping in nature. It is not sharp. It is dull. Patient denies history of heart disease. Patient has history of hypertension and advanced age which is her primary risk factors. Has not taken anything for the symptoms at this time. NursingNotes were reviewed. REVIEW OF SYSTEMS    (2-9 systems for level 4, 10 or more for level 5)     Review of Systems   Constitutional: Negative. Negative for fever. HENT: Negative. Negative for sore throat. Respiratory: Negative. Negative for shortness of breath. Cardiovascular: Positive for chest pain and palpitations.    Gastrointestinal: Negative for abdominal distention, diarrhea, nausea and vomiting. Genitourinary: Negative. Musculoskeletal: Positive for neck pain. Skin: Negative. Neurological: Negative. Negative for light-headedness and headaches. Hematological: Negative. Does not bruise/bleed easily. Except as noted above the remainder of the review of systems was reviewed and negative. PAST MEDICAL HISTORY     Past Medical History:   Diagnosis Date    Anemia 9/2014    Bleeding stomach ulcer     Hiatal hernia 9/2014    small    Hyperlipidemia     Hypertension     Inguinal hernia     no surgery yet.  Kidney calculus     Sigmoid diverticulosis 9/2014         SURGICALHISTORY       Past Surgical History:   Procedure Laterality Date    APPENDECTOMY  age 33'mayuri   Lmmariah Paula HYSTERECTOMY  age 33'mayuri   Lm Chai TONSILLECTOMY AND ADENOIDECTOMY  child    UPPER GASTROINTESTINAL ENDOSCOPY  9/2014    bleeding stomach ulcer         CURRENT MEDICATIONS       Previous Medications    ATORVASTATIN (LIPITOR) 20 MG TABLET    Take 1 tablet by mouth daily    LISINOPRIL-HYDROCHLOROTHIAZIDE (PRINZIDE;ZESTORETIC) 20-25 MG PER TABLET    Take 1 tablet by mouth daily    VITAMIN D (ERGOCALCIFEROL) 1.25 MG (69360 UT) CAPS CAPSULE    Take 1 capsule by mouth once a week            Aspirin, Demerol hcl [meperidine], and Sulfa antibiotics    FAMILY HISTORY       Family History   Problem Relation Age of Onset    Heart Disease Mother     Diabetes Mother     Cancer Mother     Heart Disease Father     Breast Cancer Sister           SOCIAL HISTORY       Social History     Socioeconomic History    Marital status:       Spouse name: None    Number of children: None    Years of education: None    Highest education level: None   Occupational History    None   Tobacco Use    Smoking status: Never Smoker    Smokeless tobacco: Never Used   Vaping Use    Vaping Use: Never used   Substance and Sexual Activity    Alcohol use: No    Drug use: No    Sexual activity: Not Currently   Other Topics Concern    None   Social History Narrative    None     Social Determinants of Health     Financial Resource Strain: Low Risk     Difficulty of Paying Living Expenses: Not hard at all   Food Insecurity: No Food Insecurity    Worried About Running Out of Food in the Last Year: Never true    Cordell of Food in the Last Year: Never true   Transportation Needs:     Lack of Transportation (Medical): Not on file    Lack of Transportation (Non-Medical): Not on file   Physical Activity:     Days of Exercise per Week: Not on file    Minutes of Exercise per Session: Not on file   Stress:     Feeling of Stress : Not on file   Social Connections:     Frequency of Communication with Friends and Family: Not on file    Frequency of Social Gatherings with Friends and Family: Not on file    Attends Nondenominational Services: Not on file    Active Member of [address] [place] [address] [employer] or Organizations: Not on file    Attends Club or Organization Meetings: Not on file    Marital Status: Not on file   Intimate Partner Violence:     Fear of Current or Ex-Partner: Not on file    Emotionally Abused: Not on file    Physically Abused: Not on file    Sexually Abused: Not on file   Housing Stability:     Unable to Pay for Housing in the Last Year: Not on file    Number of [place] in the Last Year: Not on file    Unstable Housing in the Last Year: Not on file       SCREENINGS   NIH Stroke Scale  Interval: Baseline  Level of Consciousness (1a. ): Alert  LOC Questions (1b. ):  Answers both correctly  Best Gaze (2. ): Normal  Visual (3. ): No visual loss  Facial Palsy (4. ): Normal symmetrical movement  Motor Arm, Left (5a. ): No drift  Motor Arm, Right (5b. ): No drift  Motor Leg, Left (6a. ): No drift  Motor Leg, Right (6b. ): No drift  Limb Ataxia (7. ): Absent  Sensory (8. ): Normal  Best Language (9. ): No aphasia  Dysarthria (10. ): Normal  Extinction and Inattention (11): No abnormalityGlasgow Coma Scale  Eye Opening: Spontaneous  Best Verbal Response: Oriented  Best Motor Response: Obeys commands  Nedra Coma Scale Score: 15        PHYSICAL EXAM    (up to 7 for level 4, 8 or more for level 5)     ED Triage Vitals [01/03/22 1910]   BP Temp Temp Source Pulse Resp SpO2 Height Weight   (!) 155/96 98 °F (36.7 °C) Oral 82 18 97 % 5' (1.524 m) 125 lb 10.6 oz (57 kg)       Physical Exam  Vitals and nursing note reviewed. Constitutional:       General: She is not in acute distress. Appearance: She is not toxic-appearing or diaphoretic. HENT:      Head: Normocephalic and atraumatic. Mouth/Throat:      Mouth: Mucous membranes are moist.   Eyes:      Extraocular Movements: Extraocular movements intact. Pupils: Pupils are equal, round, and reactive to light. Cardiovascular:      Rate and Rhythm: Normal rate and regular rhythm. Heart sounds: Normal heart sounds. Pulmonary:      Effort: Pulmonary effort is normal. No respiratory distress. Breath sounds: Normal breath sounds. No wheezing, rhonchi or rales. Chest:      Chest wall: No tenderness. Abdominal:      General: Abdomen is flat. Bowel sounds are normal. There is no distension. Palpations: Abdomen is soft. There is no mass. Tenderness: There is no abdominal tenderness. There is no guarding or rebound. Skin:     General: Skin is warm and dry. Capillary Refill: Capillary refill takes less than 2 seconds. Neurological:      General: No focal deficit present. Mental Status: She is alert and oriented to person, place, and time. Cranial Nerves: No cranial nerve deficit. Sensory: No sensory deficit. Motor: No weakness.       Coordination: Coordination normal.      Gait: Gait normal.   Psychiatric:         Mood and Affect: Mood normal.         Behavior: Behavior normal.         RESULTS     EKG: All EKG's are interpreted by the Emergency Department Physician who either signs or Co-signsthis chart in the absence of a cardiologist.    EKG Interpretation- 1858    Interpreted by emergency department physician    Rhythm: normal sinus   Rate: normal  Axis: left  Ectopy: premature ventricular contractions (infrequent)  Conduction: normal  ST Segments: nonspecific changes  T Waves: non specific changes  Q Waves: none    Clinical Impression: Normal sinus rhythm with 1 PVC noted and nonspecific changes in her anterior leads. Overall low amplitude. No old EKG currently for comparison. EKG Interpretation- 1933  Interpreted by emergency department physician    Rhythm: normal sinus   Rate: normal  Axis: left  Ectopy: premature ventricular contractions (infrequent)  Conduction: normal  ST Segments: nonspecific changes  T Waves: non specific changes  Q Waves: none    Clinical Impression: Normal sinus rhythm with 1 PVC noted and nonspecific changes in her anterior leads. Unchanged from previous EKG on this visit. EKG Interpretation- 3705  Interpreted by emergency department physician    Rhythm: normal sinus   Rate: normal  Axis: left  Ectopy: premature ventricular contractions (infrequent)  Conduction: normal  ST Segments: nonspecific changes  T Waves: non specific changes  Q Waves: none    Clinical Impression: Normal sinus rhythm with nonspecific ST changes unchanged from to previous EKG still no signs of acute ischemia. PVCs no longer present. RADIOLOGY:   Non-plain filmimages such as CT, Ultrasound and MRI are read by the radiologist.     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 Nodular   opacity in the right upper lung. Refer to same-day CTA chest.         CTA CHEST ABDOMEN PELVIS W CONTRAST   Final Result   No evidence of aortic aneurysm or dissection. 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  Recommend 01/03/22 2200 01/03/22 2207 01/03/22 2217 01/03/22 2220   BP: 114/63   110/73   Pulse: 67 66 64 67   Resp: 14 11 14 18   Temp:       TempSrc:       SpO2: 96% 97% 95% 94%   Weight:       Height:           Patient was given thefollowing medications:  Medications   heparin (porcine) injection 3,420 Units (has no administration in time range)   heparin (porcine) injection 3,420 Units (has no administration in time range)   heparin (porcine) injection 1,710 Units (has no administration in time range)   heparin 25,000 units in dextrose 5% 250 mL (premix) infusion (has no administration in time range)   clopidogrel (PLAVIX) tablet 300 mg (has no administration in time range)   iopamidol (ISOVUE-370) 76 % injection 100 mL (100 mLs IntraVENous Given 1/3/22 2038)       ED COURSE & MEDICAL DECISION MAKING    Pertinent Labs & Imaging studies reviewed. (See chart for details)   -  Patient seen and evaluated in the emergency department. -  Triage and nursing notes reviewed and incorporated. -  Old chart records reviewed and incorporated. -  Differential diagnosis includes: Differential Diagnosis: Acute Coronary Syndrome, Congestive Heart Failure, Thoracic Dissection, Pericarditis, Pericardial Effusion, Pulmonary Embolism, Pneumonia, Pneumothorax, Ischemic Bowel, Bowel Obstruction, PUD, GERD, Acute Cholecystitis, Pancreatitis, Hepatitis, Colitis, other    82-year-old female with history of hypertension no prior cardiac history who presents for palpitations with \"odd sensation\" to her chest which radiates to her left arm and to the right side of her neck. Not exertional in nature but occurred just prior to arrival.  Given patient's advanced age cannot rule out ACS also with radiation of pain concern for dissection. Dissection protocol has been ordered. Patient's initial EKG without signs of ischemia will repeat as well as patient will get at least 2 troponins at this time.   Given patient's age and risk factors her heart score is low moderate risk upon arrival.  Patient will receive at least 2 troponins prior to calculation of heart score. Will reeval closely. Vital signs otherwise stable at this time afebrile not tachycardic saturating well on room air normotensive. -  Work-up included:  See above  -  ED treatment included: See above  -  Results discussed with patient. Heart Score  H - 1  E - 0  A - 2  R - 1  T - 2  Total - 6      REASSESSMENT     ED Course as of 01/03/22 2239 Mon Jan 03, 2022 1943 Repeat EKG still with no signs of active ischemia. Initial troponin negative. CT scans and chest x-ray pending. [SC]   2014 Symptoms overall improved at this time vital signs unchanged. CT pending. [SC]   2142 Vitals unchanged CT still pending we will repeat troponin at this time [SC]   2153 The scan shows thyroid mass. No signs of dissection or PE. TSH added on. Patient's vitals remained stable does not show signs of thyrotoxicosis at this time. We will repeat troponin I will also consult her primary care physician at this time. [SC]   2226 Troponin now 0.04.  3rd EKG still shows no signs of ischemia. Patient states she is not supposed to take aspirin as it has caused her stomach ulcers in the past.  Will consult cardiology and plan on admission. [SC]   [phone] Cardiology recommends heparin at this time. [SC]      ED Course User Index  [SC] Denson Bernheim, MD          CRITICAL CARE TIME   Total Critical Care time was 36 minutes, excluding separately reportable procedures. There was a high probability of clinically significant/life threatening deterioration in the patient's condition which required my urgent intervention. This includes multiple reevaluations, vital sign monitoring, pulse oximetry monitoring, telemetry monitoring, clinical response to the IV medications, reviewing the nursing notes, consultation time, dictation/documentation time, and interpretation of the labwork.  (This time excludes time spent performing procedures). CONSULTS:  IP CONSULT TO CARDIOLOGY  IP CONSULT TO PRIMARY CARE PROVIDER    PROCEDURES:  Unless otherwise noted below, none     Procedures    FINAL IMPRESSION      1. Unstable angina pectoris ([facility] [address]    2. NSTEMI (non-ST elevated myocardial infarction) [facility])          DISPOSITION/PLAN   DISPOSITION Admitted 01/03/2022 10:34:45 PM      PATIENT REFERREDTO:  No follow-up provider specified.     DISCHARGEMEDICATIONS:  New Prescriptions    No medications on file          (Please note that portions of this note were completed with a voice recognition program.  Efforts were made to edit the dictations but occasionally words are mis-transcribed.)    Daquan Bradshaw MD (electronically signed)  Attending Emergency Physician          Daquan Bradshaw MD  01/03/22 5623

## 2022-01-04 NOTE — ED NOTES
Pt had episode of ST for few seconds only then resolved. MD Minor Prudent aware.       Tiffany Sneed RN  01/03/22 2007

## 2022-01-04 NOTE — PROGRESS NOTES
Clinical Pharmacy Note  Heparin Dosing       Lab Results   Component Value Date    APTT 120.1 01/04/2022     Lab Results   Component Value Date    HGB 13.3 01/04/2022    HCT 40.2 01/04/2022     01/04/2022    INR 0.97 09/19/2014       Current Infusion Rate: 680 units/hr    Plan:  Hold heparin for 1 hour  Rate: decrease to 510 units/hr  Next aPTT: 1400 1/4/22    Pharmacy will continue to monitor and adjust based on aPTT results.   Kartik Brenner, DelmerD

## 2022-01-04 NOTE — CARE COORDINATION
Case Management Assessment           Initial Evaluation                Date / Time of Evaluation: 1/4/2022 3:53 PM                 Assessment Completed by: Liyah Monteiro RN     Chart reviewed. Pt is alert and oriented x 4. She lives at home in a single-level house with her daughter ad sister. She is independent with all ADL's. Family provides support as needed. The plan is to return home at discharge. No service needs anticipated. Family can transport. Pt to ECHO today. Stress test planned. Protonix and heparin gtt's.       Patient Name: Checo Mejia     YOB: 1935  Diagnosis: Unstable angina pectoris [facility]) [I20.0]  Chest pain [R07.9]  NSTEMI (non-ST elevated myocardial infarction) [facility]) [I21.4]     Date / Time: 1/3/2022  6:48 PM    Patient Admission Status: Inpatient     Current PCP: Sharlee Skiff, MD    Chart Reviewed: Yes    Emergency Contacts:  Extended Emergency Contact Information  Primary Emergency Contact: Negra Cabrera  Address: [address] Phone: [phone]  Relation: Brother/Sister  Secondary Emergency Contact: [address] Phone: [phone]  Relation: Child    Advance Directives:   Code Status: Full Code    Financial  Payor: MEDICARE / Plan: MEDICARE PART A AND B / Product Type: *No Product type* /     Pharmacy    Talya Nguyen [address] [place] [address]  [phone] [facility] [phone]  [address] [address]  Phone: [phone] Fax: [phone]      ADLS  Support Systems: Family Members    PT AM-PAC: 22 /24  OT AM-PAC:   /24    [place]: house with daughter and sister  Steps: 1-2 steps to enter    Plans to RETURN to current housing: Yes    Home Care Information  Currently ACTIVE with [address]: No    Durable Medical Equipment  Equipment: bath bench    Home Oxygen and Respiratory Equipment  Has HOME OXYGEN prior to admission: No    DISCHARGE PLAN:  Disposition: Home- No Services Needed    Transportation PLAN for discharge: family     The Patient and/or patient representative Nathen Wagner and her family were provided with a choice of provider and agrees with the discharge plan Yes    Freedom of choice list was provided with basic dialogue that supports the patient's individualized plan of care/goals and shares the quality data associated with the providers.  Yes    Care Transition patient: Yes    Roopa Franco RN  Case Management  [phone]

## 2022-01-04 NOTE — PROGRESS NOTES
4 Eyes Skin Assessment     NAME:  Adam uBrris  YOB: 1935  MEDICAL RECORD NUMBER:  [id number]    The patient is being assess for  Admission    I agree that 2 RN's have performed a thorough Head to Toe Skin Assessment on the patient. ALL assessment sites listed below have been assessed. Areas assessed by both nurses:    Head, Face, Ears, Shoulders, Back, Chest, Arms, Elbows, Hands, Sacrum. Buttock, Coccyx, Ischium and Legs. Feet and Heels        Does the Patient have a Wound?  No noted wound(s)       Raymundo Prevention initiated:  No   Wound Care Orders initiated:  No    Pressure Injury (Stage 3,4, Unstageable, DTI, NWPT, and Complex wounds) if present place consult order under [de-identified] No    New and Established Ostomies if present place consult order under : No      Nurse 1 eSignature: Electronically signed by Rosendo Quiñones RN on 1/4/22 at 2:30 AM EST    **SHARE this note so that the co-signing nurse is able to place an eSignature**    Nurse 2 eSignature: Electronically signed by Yamileth Freeman RN on 1/4/22 at 3:55 AM EST

## 2022-01-04 NOTE — CONSULTS
[address]                                  CONSULTATION    PATIENT NAME: Gokul Steel                    :        1935  MED REC NO:   [id number]                          ROOM:       7008  ACCOUNT NO:   [de-identified]                           ADMIT DATE: 2022  PROVIDER:     Barbara Sosa MD    CARDIOLOGY CONSULTATION    CONSULT DATE:  2022    HISTORY OF PRESENT ILLNESS:  This is a very pleasant 59-year-old female  with history of hypertension, hyperlipidemia, who presented to the  hospital with a pressure in the chest with arm numbness that started  last night. She also felt slightly short of breath. She denied any  diaphoresis with this. She had no similar symptoms in the past.  She  presented to the emergency room. Her EKG shows some minor changes. Her  troponin is elevated leading to this cardiac consultation. REVIEW OF SYSTEMS:  Please see HPI. All other systems are reviewed and  they are negative. PAST MEDICAL HISTORY:  1. History of hypertension. 2.  Hyperlipidemia. 3.  No known history of diabetes or lung disease. SOCIAL HISTORY:  Denies any smoking or alcohol abuse. FAMILY HISTORY:  Both the parents had heart disease and father  in  mid 46s, one brother  in 76s with heart disease as well. PAST SURGICAL HISTORY:  She had appendectomy, hysterectomy,  tonsillectomy, and upper GI endoscopy. MEDICATIONS:  Have been reviewed. ALLERGIES:  Have been reviewed. PHYSICAL EXAMINATION:  VITAL SIGNS:  Her pulse is 74 and regular, blood pressure 154/65,  respirations are 16, oxygen saturation 97%. CONSTITUTIONAL:  She is alert and oriented. HEENT:  Neck is supple. No JVD. No thyromegaly. No lymphadenopathy. No bruits were heard. Eyes show no pale conjunctivae or icterus. CARDIAC:  Normal S1 and S2. No gallop, murmur, or rub.   LUNGS: Clear.  ABDOMEN:  Soft, nontender. Bowel sounds are present. EXTREMITIES:  No edema. NEUROLOGICAL:  Alert and oriented. Cranial nerves II through XII  intact. No focal deficit. SKIN:  No rashes. LABORATORY DATA:  Sodium 142, potassium 3.5, chloride 106, bicarb 23,  BUN is 31, creatinine 1. The patient's troponin was initially 0.04,  repeat troponin is also 0.04. Hemoglobin 13.3, hematocrit is 40.2. The CTA of the chest and abdomen showed no dissection with a 4.8-cm mass  at the inferior pole of the right lobe of the thyroid gland, sigmoid  colon diverticulosis. The EKG shows sinus rhythm, no acute ST and T-wave changes. IMPRESSION:  1. This is an 82-year-old female who has presented with a chest pain at  rest, appears with a new-onset angina with evidence of non-ST-elevation  myocardial infarction. Underlying coronary artery disease is strongly  suspected. 2.  Hypertension. 3.  Hyperlipidemia. RECOMMENDATIONS:  We will proceed with coronary angiography. The  possible benefits and risks of the procedure have been explained. The  patient is willing to proceed.         Alexus Box MD    D: 01/04/2022 10:21:32       T: 01/04/2022 12:51:22     [email or web address]  Job#: [id number]     Doc#: [id number]    CC:

## 2022-01-04 NOTE — PLAN OF CARE
Problem: DAILY CARE  Goal: Daily care needs are met  Outcome: Ongoing     Problem: SAFETY  Goal: Free from accidental physical injury  Outcome: Ongoing  Goal: Free from intentional harm  Outcome: Ongoing     Problem: PAIN  Goal: Patient's pain/discomfort is manageable  Outcome: Ongoing     Problem: KNOWLEDGE DEFICIT  Goal: Patient/S.O. demonstrates understanding of disease process, treatment plan, medications, and discharge instructions.   Outcome: Ongoing     Problem: DISCHARGE BARRIERS  Goal: Patient's continuum of care needs are met  Outcome: Ongoing

## 2022-01-04 NOTE — ED NOTES
Repositioned in bed for comfort warm blankets applied. Lights dimmed.  Updated      Marixa Walker RN  01/03/22 3884

## 2022-01-04 NOTE — ED NOTES
MD Vona Lesches into discuss admission plan of care and process. Pt agreeable to plan.       Rachel Cooley RN  01/03/22 2905

## 2022-01-04 NOTE — PLAN OF CARE
Problem: SAFETY  Goal: Free from accidental physical injury  1/4/2022 1029 by Aniyah Duenas RN  Outcome: Ongoing  Note: Pt A&O aware of her abilities. Pt understands and knows to call when in need of ambulation. Measures were made to prevent accidental needle stick, friction, shear, etc. Environment kept free of clutter and adequate lighting provided. Will continue to monitor for physical injury. Problem: DAILY CARE  Goal: Daily care needs are met  1/4/2022 1029 by Aniyah Duenas RN  Note: In collaboration with the healthcare team, the patient's daily care needs are meet. Patient is encouraged to perform tasks independently per ability. Problem: PAIN  Goal: Patient's pain/discomfort is manageable  1/4/2022 1029 by Aniyah Duenas RN  Note: Pain/discomfort being managed with PRN analgesics per MD order. Pt able to express presence and absence of pain and rate pain appropriately using numerical scale       Problem: SKIN INTEGRITY  Goal: Skin integrity is maintained or improved  1/4/2022 1029 by Aniyah Duenas RN  Note: Skin assessment completed every shift. Pt assessed for incontinence, appropriate barrier cream applied prn as needed. Pt encouraged to turn/rotate every 2 hours. Assistance provided if pt unable to do so themselves. Problem: KNOWLEDGE DEFICIT  Goal: Patient/S.O. demonstrates understanding of disease process, treatment plan, medications, and discharge instructions. 1/4/2022 1029 by Aniyah Duenas RN  Note: Patient needs continued education of disease process, treatment plan, medications and discharge instructions. Teachings will be provided at a level the patient/family understands. Problem: DISCHARGE BARRIERS  Goal: Patient's continuum of care needs are met  1/4/2022 1029 by Aniyah Duenas RN  Note: Continuing to work with patient and health care team on discharge plan. Discharge instructions and medication management will be reviewed prior to discharge.          Problem: Falls - Risk of:  Goal: Will remain free from falls  Description: Will remain free from falls  Note: Pt free from falls this shift. Fall precautions in place at all times. Call light within reach. Pt able to and agreeable to contact for safety appropriately.

## 2022-01-04 NOTE — H&P
[facility] Internal Medicine  History and Physical    Patient's PCP: Rick Marcano MD  Code Status: Full Code  History Obtained From:  patient    CC: Heart racing    HPI:    Mason Burris is an 30-year-old lady with a past medical history significant for hyperlipidemia, hypertension, PUD who presented to the emergency department yesterday for evaluation of chest pain and palpitations. After dinner yesterday evening, patient experienced heart racing, shortness of breath and chest pain with radiation down her left arm. She said this occurred at rest.  She said that she had been under stress yesterday as her pet is in the hospital.  She reported the symptoms lasted about 30 minutes before presenting to the emergency department. She is not currently experiencing chest pain. She does not report any fever, chills, cough, PND, thought apnea, lower extremity swelling, abdominal pain, nausea, vomiting, diarrhea. She has been adherent with her home medication regimen. Past Medical / Surgical History:    Past Medical History:   Diagnosis Date    Anemia 9/2014    Bleeding stomach ulcer     Hiatal hernia 9/2014    small    Hyperlipidemia     Hypertension     Inguinal hernia     no surgery yet.  Kidney calculus     Sigmoid diverticulosis 9/2014     Past Surgical History:   Procedure Laterality Date    APPENDECTOMY  age 28'mayuri    HYSTERECTOMY  age 33'mayuri   [facility] TONSILLECTOMY AND ADENOIDECTOMY  child    UPPER GASTROINTESTINAL ENDOSCOPY  9/2014    bleeding stomach ulcer       Medications Prior to Admission:    No current facility-administered medications on file prior to encounter.      Current Outpatient Medications on File Prior to Encounter   Medication Sig Dispense Refill    lisinopril-hydroCHLOROthiazide (PRINZIDE;ZESTORETIC) 20-25 MG per tablet Take 1 tablet by mouth daily 90 tablet 3    atorvastatin (LIPITOR) 20 MG tablet Take 1 tablet by mouth daily 90 tablet 3    vitamin D (ERGOCALCIFEROL) 1.25 MG (76421 UT) CAPS capsule Take 1 capsule by mouth once a week 12 capsule 1       Allergies:  Aspirin, Demerol hcl [meperidine], and Sulfa antibiotics    Social History:   TOBACCO:   reports that she has never smoked. She has never used smokeless tobacco.     ETOH:   reports no history of alcohol use. Family History:       Problem Relation Age of Onset    Heart Disease Mother     Diabetes Mother     Cancer Mother     Heart Disease Father     Breast Cancer Sister        ROS:   All other systems reviewed and are negative. PHYSICAL EXAM:  /79   Pulse 76   Temp 97.9 °F (36.6 °C) (Oral)   Resp 16   Ht 5' (1.524 m)   Wt 132 lb 12.8 oz (60.2 kg)   SpO2 97%   BMI 25.94 kg/m²   BP (!) 161/65   Pulse 74   Temp 97.1 °F (36.2 °C) (Oral)   Resp 16   Ht 5' (1.524 m)   Wt 132 lb 12.8 oz (60.2 kg)   SpO2 97%   BMI 25.94 kg/m²   General: Well-appearing  HEENT: PERRL, EOMI, moist mucosa, arcus senilis  Cardiac: Regular rate and rhythm, RSVP systolic murmur, warm extremities, no peripheral edema  Lungs well dressed bilaterally  Abdomen: Soft nontender nondistended  Musculoskeletal: Strength grossly intact, no joint effusions  Neuro: Nonfocal    LABS:  Recent Labs     01/03/22 1910 01/04/22  0457   WBC 7.0 5.9   HGB 14.0 13.3   HCT 42.7 40.2    189                                                                  Recent Labs     01/03/22 1910      K 3.5      CO2 26   BUN 31*   CREATININE 1.0   GLUCOSE 133*     Recent Labs     01/03/22 1910   AST 23   ALT 9*   BILITOT <0.2   ALKPHOS 114     Recent Labs     01/03/22  2155 01/04/22  0206 01/04/22  0732   TROPONINI 0.04* 0.03* 0.04*     No results for input(s): BNP in the last 72 hours. No results found for: PHART, BXJ0TPE, PO2ART  No results for input(s): INR in the last 72 hours.   No results for input(s): NITRITE, COLORU, PHUR, LABCAST, WBCUA, RBCUA, MUCUS, TRICHOMONAS, YEAST, BACTERIA, CLARITYU, SPECGRAV, LEUKOCYTESUR, UROBILINOGEN, BILIRUBINUR, BLOODU, GLUCOSEU, AMORPHOUS in the last 72 hours. Invalid input(s): Federico Homans      U/A:    Lab Results   Component Value Date    NITRITE neg 01/27/2011    COLORU YELLOW 06/02/2020    WBCUA 1 06/02/2020    RBCUA 3 06/02/2020    CLARITYU Clear 06/02/2020    SPECGRAV 1.021 06/02/2020    LEUKOCYTESUR Negative 06/02/2020    BLOODU SMALL 06/02/2020    GLUCOSEU Negative 06/02/2020     EKG:  Clinical Impression: Sinus rhythm, PVC, CAMILLE     CT chest abdomen pelvis:    No evidence of aortic aneurysm or dissection.       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 Recommend further evaluation with thyroid ultrasound.       Small hiatal hernia.       Extensive sigmoid colon diverticulosis without evidence of diverticulitis.       Status post hysterectomy. Assessment & Plan:    Karen Burris is a 82 y.o. female who was admitted with Chest pain. Principal Problem:    Chest pain  NSTEMI  Heparin, aspirin, Plavix  Cardiology consult, plans for cath  Systolic murmur  Obtain echo    Hyperlipidemia  Plan: Repeat lipid panel well-controlled, continue atorvastatin 20    HTN (hypertension)  Plan: Continue hydrochlorothiazide lisinopril, start beta-blocker pending cath    Hypomagnesemia  Plan: Replete    Thyroid mass  Need ultrasound after cardiac work-up  Osteoporosis  Needs vitamin D replacement for bisphosphonates outpatient      F/E/N: N.p.o.  PPx: Heparin drip, Protonix  Dispo: Pending cardiac work-up    Sylvia Saucedo MD 1/4/2022 8:22 AM    Documentation was done using voice recognition dragon software. Every effort was made to ensure accuracy; however, inadvertent unintentional computerized transcription errors may be present.

## 2022-01-04 NOTE — PROGRESS NOTES
Physical Therapy    Facility/Department: [address] MED SURG  Initial Assessment/Treatment Session    NAME: Brandy Burris  : 1935  MRN: [id number]    Date of Service: 2022    Discharge Recommendations:  Home with assist PRN,Patient would benefit from continued therapy after discharge   PT Equipment Recommendations  Equipment Needed: No    Assessment   Body structures, Functions, Activity limitations: Increased pain  Assessment: Pt is an 82 y.o. F. admitted 1/3 for NSTEMI. She presents pleasant and agreeable to evaluation, demonstrating functional LE strength, able to complete mobility tasks in room supervision/(I). Pt is scheduled for cardiac cath. Will continue to follow in the acute setting to ensure pt is safe with mobility post-procedure. Anticipate safe return home with prn assist from family. Will assess for home PT needs. Jenn Burris scored a 22/ on the AM-PAC short mobility form. If patient discharges prior to next session this note will serve as a discharge summary. Please see below for the latest assessment towards goals. Specific instructions for Next Treatment: Assess mobility post-cath  Prognosis: Good  Decision Making: Low Complexity  History: See below  Exam: Strength; ROM; Balance; Ambulation  Clinical Presentation: Stable  PT Education: Goals; General Safety;PT Role;Plan of Care  Barriers to Learning: None  REQUIRES PT FOLLOW UP: Yes  Activity Tolerance  Activity Tolerance: Patient Tolerated treatment well       Patient Diagnosis(es): The primary encounter diagnosis was Unstable angina pectoris ([facility] [address] A diagnosis of NSTEMI (non-ST elevated myocardial infarction) [facility]) was also pertinent to this visit. has a past medical history of Anemia, Bleeding stomach ulcer, Hiatal hernia, Hyperlipidemia, Hypertension, Inguinal hernia, Kidney calculus, and Sigmoid diverticulosis. has a past surgical history that includes Hysterectomy (age 33'mayuri);  Appendectomy (age 33'mayuri); Tonsillectomy and adenoidectomy (child); and Upper gastrointestinal endoscopy (9/2014). Restrictions  Restrictions/Precautions  Restrictions/Precautions: Fall Risk     Vision/Hearing  Vision: Impaired  Vision Exceptions: Wears glasses at all times  Hearing: Within functional limits       Subjective  General  Chart Reviewed: Yes  Additional Pertinent Hx: Steve Burris is an 31-year-old lady with a past medical history significant for hyperlipidemia, hypertension, PUD who presented to the emergency department yesterday for evaluation of chest pain and palpitations. After dinner yesterday evening, patient experienced heart racing, shortness of breath and chest pain with radiation down her left arm. She said this occurred at rest.  She said that she had been under stress yesterday as her pet is in the hospital.  She reported the symptoms lasted about 30 minutes before presenting to the emergency department. She is not currently experiencing chest pain. She does not report any fever, chills, cough, PND, thought apnea, lower extremity swelling, abdominal pain, nausea, vomiting, diarrhea. She has been adherent with her home medication regimen. Response To Previous Treatment: Not applicable  Referring Practitioner: Dr. Kecia Mejia  Referral Date : 01/04/22  Diagnosis: Chest Pain; NSTEMI; HLD  Follows Commands: Within Functional Limits  Subjective  Subjective: Pt denies chest pain. Agreeable to evaluation.   Pain Screening  Patient Currently in Pain: No    Orientation  Orientation  Overall Orientation Status: Within Normal Limits     Social/Functional History  Social/Functional History  Lives With: Family,Daughter (Sister)  Type of Home: House  Home Layout: One level,Laundry in basement  Home Access: Stairs to enter without rails  Entrance Stairs - Number of Steps: 1-2  Bathroom Shower/Tub: Tub/Shower unit  Bathroom Toilet: Standard  Bathroom Equipment: Shower chair  ADL Assistance: Independent  Ambulation Assistance: Independent  Transfer Assistance: Independent  Active : No  Occupation: Retired  Additional Comments: Denies hx of falls    Objective    AROM RLE (degrees)  RLE AROM: WNL  RLE General AROM: Hip Flex, Knee Flex/Ext WNL  AROM LLE (degrees)  LLE AROM : WNL  LLE General AROM: Hip Flex, Knee Flex/Ext WNL  AROM RUE (degrees)  RUE AROM : WFL  RUE General AROM: Shoulder Flex mildly decreased, Elbow Flex/Ext WFL  AROM LUE (degrees)  LUE AROM : WFL  LUE General AROM: Shoulder Flex mildly decreased, Elbow Flex/Ext WFL    Strength RLE  Strength RLE: WFL  Comment: hip Flex, Knee Flex/Ext grossly 4+/5  Strength LLE  Strength LLE: WFL  Comment: hip Flex, Knee Flex/Ext grossly 4+/5  Strength RUE  Strength RUE: WFL  Strength LUE  Strength LUE: WFL    Tone RLE  RLE Tone: Normotonic  Tone LLE  LLE Tone: Normotonic  Motor Control  Gross Motor?: WFL     Bed mobility  Supine to Sit: Independent  Sit to Supine: Independent    Transfers  Sit to Stand: Independent  Stand to sit: Independent    Ambulation  Ambulation?: Yes  Ambulation 1  Surface: level tile  Device: No Device  Assistance: Supervision  Quality of Gait: Fast pace, step-through pattern, no instability, no c/o dizziness or chest pain. Distance: 10', 50'  Stairs/Curb  Stairs?: No      Plan   Plan  Times per week: 1-2 more sessions  Specific instructions for Next Treatment: Assess mobility post-cath  Safety Devices  Type of devices: All fall risk precautions in place,Call light within reach,Left in bed,Nurse notified,Gait belt  Restraints  Initially in place: No    AM-PAC Score  AM-PAC Inpatient Mobility Raw Score : 22 (01/04/22 1158)  AM-PAC Inpatient T-Scale Score : 53.28 (01/04/22 1158)  Mobility Inpatient CMS 0-100% Score: 20.91 (01/04/22 1158)  Mobility Inpatient CMS G-Code Modifier : CJ (01/04/22 1158)          Goals  Short term goals  Time Frame for Short term goals:  In 2-3 days pt will perform  Short term goal 1: Ambulation 150' without device, (I)  Short term goal 2: Ascend/Descend 2 steps with supervision  Patient Goals   Patient goals : To return directly home       Therapy Time   Individual Concurrent Group Co-treatment   Time In 1138         Time Out 1201         Minutes 23         Timed Code Treatment Minutes: 8 Minutes   Evaluation time: 15 min.      Marco Antonio Ross PT    Electronically signed by Marco Antonio Ross, PT [id number] on 1/4/2022 at 12:03 PM

## 2022-01-04 NOTE — OP NOTE
[address]   Procedure Note    CLINICAL HISTORY:       Anu Naik is a 82 y.o. female with a history of hypertension. She was admitted with complaints of chest pain. Cardiac markers were positive. EKG showed nonspecific ST-T wave abnormality. Patient Active Problem List   Diagnosis    Hyperlipidemia    HTN (hypertension)    Chest pain    Elevated troponin    Hypomagnesemia    Thyroid mass    NSTEMI (non-ST elevated myocardial infarction) ([facility] [address]       Prior to Admission medications    Medication Sig Start Date End Date Taking? Authorizing Provider   lisinopril-hydroCHLOROthiazide (PRINZIDE;ZESTORETIC) 20-25 MG per tablet Take 1 tablet by mouth daily 5/19/21  Yes Tim Mcintyre MD   atorvastatin (LIPITOR) 20 MG tablet Take 1 tablet by mouth daily 5/19/21  Yes Tim Mcintyre MD   vitamin D (ERGOCALCIFEROL) 1.25 MG (09582 UT) CAPS capsule Take 1 capsule by mouth once a week 12/28/21   Tim Mcintyre MD          The risks, benefits, and details of the procedure were explained to the patient. The patient verbalized understanding and wanted to proceed. Informed written consent was obtained. INDICATION:  NSTEMI    PROCEDURES PERFORMED:   Coronary angiogram     PROCEDURE TECHNIQUE:  The patient was approached from the right radial artery using a 5-6  Sinhala slender sheath. Left coronary angiography was done using a Verenice L3.5 diagnostic catheter. Right coronary angiography was done using a Verenice R4 guide catheter. CONTRAST:  Total of 50 cc. COMPLICATIONS:  None. At the end of the procedure a TR device was used for hemostasis. EBL: 5 cc    Moderate Sedation:  Start time: 1600  Stop time: 1627  1.5 mg versed   75 mcg fentanyl   An independent trained observer pushed medications at my direction. We monitored the patient's level of consciousness and vital signs/physiologic status throughout the procedure duration (see start and start times above).        HEMODYNAMICS: Aortic pressure was 130/80    ANGIOGRAM/CORONARY ARTERIOGRAM:       The left main coronary artery is normal .    The left anterior descending artery is normal .    The left circumflex artery is normal .    The right coronary artery is normal .      INTERVENTION  1.  None     SUMMARY:   Normal coronary arteries       RECOMMENDATION:   - risk factor modification   - medical therapy     Timbo Maya MD [address] [facility], Interventional Cardiology, and Peripheral Vascular [address] [facility]   (C): [phone]  (O): [phone]

## 2022-01-05 VITALS
RESPIRATION RATE: 19 BRPM | DIASTOLIC BLOOD PRESSURE: 69 MMHG | HEART RATE: 92 BPM | HEIGHT: 60 IN | WEIGHT: 134.48 LBS | OXYGEN SATURATION: 96 % | BODY MASS INDEX: 26.4 KG/M2 | SYSTOLIC BLOOD PRESSURE: 149 MMHG | TEMPERATURE: 97.5 F

## 2022-01-05 PROBLEM — R07.9 CHEST PAIN: Status: RESOLVED | Noted: 2022-01-03 | Resolved: 2022-01-05

## 2022-01-05 PROBLEM — I21.4 NSTEMI (NON-ST ELEVATED MYOCARDIAL INFARCTION) (HCC): Status: RESOLVED | Noted: 2022-01-04 | Resolved: 2022-01-05

## 2022-01-05 PROBLEM — R77.8 ELEVATED TROPONIN: Status: RESOLVED | Noted: 2022-01-03 | Resolved: 2022-01-05

## 2022-01-05 PROBLEM — E83.42 HYPOMAGNESEMIA: Status: RESOLVED | Noted: 2022-01-03 | Resolved: 2022-01-05

## 2022-01-05 PROBLEM — R79.89 ELEVATED TROPONIN: Status: RESOLVED | Noted: 2022-01-03 | Resolved: 2022-01-05

## 2022-01-05 LAB
ANION GAP SERPL CALCULATED.3IONS-SCNC: 11 MMOL/L (ref 3–16)
BUN BLDV-MCNC: 15 MG/DL (ref 7–20)
CALCIUM SERPL-MCNC: 8.8 MG/DL (ref 8.3–10.6)
CHLORIDE BLD-SCNC: 104 MMOL/L (ref 99–110)
CO2: 25 MMOL/L (ref 21–32)
CREAT SERPL-MCNC: 0.6 MG/DL (ref 0.6–1.2)
GFR AFRICAN AMERICAN: >60
GFR NON-AFRICAN AMERICAN: >60
GLUCOSE BLD-MCNC: 129 MG/DL (ref 70–99)
HCT VFR BLD CALC: 41.1 % (ref 36–48)
HEMOGLOBIN: 13.6 G/DL (ref 12–16)
MCH RBC QN AUTO: 30.4 PG (ref 26–34)
MCHC RBC AUTO-ENTMCNC: 33.2 G/DL (ref 31–36)
MCV RBC AUTO: 91.6 FL (ref 80–100)
PDW BLD-RTO: 13.5 % (ref 12.4–15.4)
PLATELET # BLD: 207 K/UL (ref 135–450)
PMV BLD AUTO: 8.6 FL (ref 5–10.5)
POTASSIUM SERPL-SCNC: 3 MMOL/L (ref 3.5–5.1)
RBC # BLD: 4.49 M/UL (ref 4–5.2)
SODIUM BLD-SCNC: 140 MMOL/L (ref 136–145)
WBC # BLD: 11 K/UL (ref 4–11)

## 2022-01-05 PROCEDURE — 6360000002 HC RX W HCPCS: Performed by: NURSE PRACTITIONER

## 2022-01-05 PROCEDURE — 2580000003 HC RX 258: Performed by: INTERNAL MEDICINE

## 2022-01-05 PROCEDURE — 36415 COLL VENOUS BLD VENIPUNCTURE: CPT

## 2022-01-05 PROCEDURE — 80048 BASIC METABOLIC PNL TOTAL CA: CPT

## 2022-01-05 PROCEDURE — 97116 GAIT TRAINING THERAPY: CPT | Performed by: PHYSICAL THERAPIST

## 2022-01-05 PROCEDURE — 97530 THERAPEUTIC ACTIVITIES: CPT | Performed by: PHYSICAL THERAPIST

## 2022-01-05 PROCEDURE — 99232 SBSQ HOSP IP/OBS MODERATE 35: CPT | Performed by: STUDENT IN AN ORGANIZED HEALTH CARE EDUCATION/TRAINING PROGRAM

## 2022-01-05 PROCEDURE — 6370000000 HC RX 637 (ALT 250 FOR IP): Performed by: INTERNAL MEDICINE

## 2022-01-05 PROCEDURE — 6370000000 HC RX 637 (ALT 250 FOR IP): Performed by: STUDENT IN AN ORGANIZED HEALTH CARE EDUCATION/TRAINING PROGRAM

## 2022-01-05 PROCEDURE — 99233 SBSQ HOSP IP/OBS HIGH 50: CPT | Performed by: NURSE PRACTITIONER

## 2022-01-05 PROCEDURE — 85027 COMPLETE CBC AUTOMATED: CPT

## 2022-01-05 RX ORDER — PANTOPRAZOLE SODIUM 40 MG/1
40 TABLET, DELAYED RELEASE ORAL
Qty: 30 TABLET | Refills: 0 | Status: SHIPPED | OUTPATIENT
Start: 2022-01-06 | End: 2022-01-06 | Stop reason: SDUPTHER

## 2022-01-05 RX ORDER — POTASSIUM CHLORIDE 1500 MG/1
40 TABLET, FILM COATED, EXTENDED RELEASE ORAL DAILY
Qty: 60 TABLET | Refills: 3 | Status: SHIPPED | OUTPATIENT
Start: 2022-01-06 | End: 2022-01-06 | Stop reason: SDUPTHER

## 2022-01-05 RX ORDER — CLOPIDOGREL BISULFATE 75 MG/1
75 TABLET ORAL DAILY
Qty: 30 TABLET | Refills: 3 | Status: SHIPPED | OUTPATIENT
Start: 2022-01-06 | End: 2022-01-06 | Stop reason: SDUPTHER

## 2022-01-05 RX ORDER — MAGNESIUM SULFATE IN WATER 40 MG/ML
2000 INJECTION, SOLUTION INTRAVENOUS ONCE
Status: COMPLETED | OUTPATIENT
Start: 2022-01-05 | End: 2022-01-05

## 2022-01-05 RX ORDER — ERGOCALCIFEROL 1.25 MG/1
50000 CAPSULE ORAL WEEKLY
Qty: 12 CAPSULE | Refills: 0 | Status: SHIPPED | OUTPATIENT
Start: 2022-01-05

## 2022-01-05 RX ORDER — POTASSIUM CHLORIDE 750 MG/1
40 TABLET, FILM COATED, EXTENDED RELEASE ORAL DAILY
Status: DISCONTINUED | OUTPATIENT
Start: 2022-01-05 | End: 2022-01-05 | Stop reason: HOSPADM

## 2022-01-05 RX ADMIN — PANTOPRAZOLE SODIUM 40 MG: 40 TABLET, DELAYED RELEASE ORAL at 06:13

## 2022-01-05 RX ADMIN — CLOPIDOGREL BISULFATE 75 MG: 75 TABLET ORAL at 08:45

## 2022-01-05 RX ADMIN — MAGNESIUM SULFATE HEPTAHYDRATE 2000 MG: 40 INJECTION, SOLUTION INTRAVENOUS at 12:47

## 2022-01-05 RX ADMIN — POTASSIUM CHLORIDE 40 MEQ: 750 TABLET, FILM COATED, EXTENDED RELEASE ORAL at 08:46

## 2022-01-05 RX ADMIN — LISINOPRIL AND HYDROCHLOROTHIAZIDE 2 TABLET: 12.5; 1 TABLET ORAL at 08:45

## 2022-01-05 RX ADMIN — SODIUM CHLORIDE: 9 INJECTION, SOLUTION INTRAVENOUS at 01:59

## 2022-01-05 RX ADMIN — ATORVASTATIN CALCIUM 20 MG: 20 TABLET, FILM COATED ORAL at 08:45

## 2022-01-05 ASSESSMENT — PAIN SCALES - GENERAL
PAINLEVEL_OUTOF10: 0

## 2022-01-05 NOTE — PLAN OF CARE
Problem: SAFETY  Goal: Free from accidental physical injury  Outcome: Ongoing     Problem: SAFETY  Goal: Free from intentional harm  Outcome: Ongoing     Problem: DAILY CARE  Goal: Daily care needs are met  Outcome: Ongoing   Assist with daily care needs. Problem: PAIN  Goal: Patient's pain/discomfort is manageable  Outcome: Ongoing   Pain/discomfort being managed with PRN analgesics per MD orders. Pt able to express presence and absence of pain and rate pain appropriately using numerical scale. Problem: SKIN INTEGRITY  Goal: Skin integrity is maintained or improved  Outcome: Ongoing     Skin care protocal in place. Problem: KNOWLEDGE DEFICIT  Goal: Patient/S.O. demonstrates understanding of disease process, treatment plan, medications, and discharge instructions. Outcome: Ongoing     Problem: DISCHARGE BARRIERS  Goal: Patient's continuum of care needs are met  Outcome: Ongoing     Problem: Falls - Risk of:  Goal: Will remain free from falls  Description: Will remain free from falls  Outcome: Ongoing     Problem: Falls - Risk of:  Goal: Absence of physical injury  Description: Absence of physical injury  Outcome: Ongoing   Patient uses call light appropriately to express needs. Bed to lowest position with door open and call light in reach. All fall precautions implemented at this time. Bed alarm on for safety. Siderails up x2. Non skid footwear in place. Patient has had no falls this shift. Will continue to monitor.

## 2022-01-05 NOTE — FLOWSHEET NOTE
Pt returned from cath lab. VSS. R radial site intact, TR band removed as per protocol without complications. Dressing applied, C/D/I. No signs bleeding, hematoma, or other complications. Will monitor.    Electronically signed by Miladis Elizabeth RN on 1/4/2022 at 7:40 PM

## 2022-01-05 NOTE — PROGRESS NOTES
Name: Paulina Lopez Minor  /Age/Sex: 1935 (82 y.o. female)   MRN & CSN: [id number] & [id number]  Admission Date/Time: 1/3/2022  6:48 PM   Location: [unfilled] [id number]  Current Hospital Day: Hospital Day: 3   Principal Problem: Chest pain  HPI   Patient seen and examined. No issues overnight. Patient does not have any chest pain or shortness of breath. Tolerated cath well yesterday. She did not report any palpitations overnight. Appetite is good. Review telemetry, PVCs overnight. All other review of systems negative unless noted above. VITALS  Vitals:    22 0838   BP: (!) 145/80   Pulse: 88   Resp: 16   Temp: 98.3 °F (36.8 °C)   SpO2: 94%         PHYSICAL EXAM   General: Well-appearing  HEENT: PERRL, EOMI, moist mucosa, arcus senilis  Cardiac: Regular rate and rhythm, warm extremities, no peripheral edema. Right radial wrist without bruit, no hematoma  Lungs well dressed bilaterally  Abdomen: Soft nontender nondistended  Musculoskeletal: Strength grossly intact, no joint effusions  Neuro: Nonfocal  LABS   BMP  Recent Labs     22  0548     --  140   K 3.5  --  3.0*     --  104   CO2 26  --  25   BUN 31*  --  15   CREATININE 1.0  --  0.6   CALCIUM 9.8  --  8.8   MG 1.50* 1.70*  --      CBC/COAGS  Recent Labs     22  0548   WBC 7.0 5.9 11.0   HCT 42.7 40.2 41.1    189 207     Liver & Pancreas  Recent Labs     22   AST 23   ALT 9*   ALKPHOS 114   LIPASE 34.0          IMAGING   Echo: Normal LV. Mild right left atrial dilation. Trivial mitral and tricuspid regurgitation.  Ascending aorta mildly dilated measuring 3.9 cm   Above studies and images were personally reviewed by myself    MEDS   Scheduled Meds:   potassium chloride  40 mEq Oral Daily    atorvastatin  20 mg Oral Daily    lisinopril-hydroCHLOROthiazide  2 tablet Oral Daily    clopidogrel  75 mg Oral Daily    pantoprazole 40 mg Oral QAM AC    sodium chloride flush  5-40 mL IntraVENous 2 times per day     Continuous Infusions:   sodium chloride      sodium chloride 50 mL/hr at 01/05/22 0545    sodium chloride      sodium chloride       PRN Meds:sodium chloride, ondansetron **OR** ondansetron, acetaminophen **OR** acetaminophen, polyethylene glycol, magnesium sulfate, sodium chloride, iopamidol, sodium chloride flush, sodium chloride, acetaminophen     CURRENT HOSPITAL PROBLEMS   Principal Problem:    Chest pain  Active Problems:    Hyperlipidemia    HTN (hypertension)    Elevated troponin    Hypomagnesemia    Thyroid mass    NSTEMI (non-ST elevated myocardial infarction) ([facility] [address]  Resolved Problems:    * No resolved hospital problems. *    Chest pain  NSTEMI  This has resolved. She had unremarkable cardiac catheterization.   She had a mostly unremarkable echocardiogram.  Will discuss DAPT with cardiology    PVCs  If she continues to have symptomatic palpitations may start beta-blocker    HLD  Continue statin    Thyroid mass  We will arrange for outpatient ultrasound    Osteoporosis  Vitamin D replacement for bisphosphonate    Hypomagnesemia  Hypokalemia  Replete      Dispo: Discharge today    Ani Silveira MD 1/5/2022 11:26 AM

## 2022-01-05 NOTE — PROGRESS NOTES
Physical Therapy  Facility/Department: [address] MED SURG  Daily Treatment Note  NAME: Onelia Burris  : 1935  MRN: [id number]    Date of Service: 2022    Discharge Recommendations:  Home with assist PRN   PT Equipment Recommendations  Equipment Needed: No  Patient denies need/desire for continued PT at home after discharge later today. Assessment   Body structures, Functions, Activity limitations: Increased pain  Assessment: Pt is an 82 y.o. F. admitted 1/3 for NSTEMI. She presents pleasant and agreeable to evaluation, demonstrating functional LE strength, able to complete mobility tasks in room supervision/(I) and ambulated community distances with no device with modified independence for management of IV pole only. Pt appears to be safe from a physical standpoint to return home with her sister. No further PT is recommended at this time. If patient is not discharged to home later today as expected, will continue to monitor while on acute to ensure she ambulates longer distances and attempts steps as able. Prognosis: Good  Decision Making: Low Complexity  History: See below  Exam: Strength; ROM; Balance; Ambulation  Clinical Presentation: Stable  PT Education: Goals; General Safety;PT Role;Plan of Care  Patient Education: how women may experience different synptoms with MI compared to men  Barriers to Learning: None  REQUIRES PT FOLLOW UP: Yes  Activity Tolerance  Activity Tolerance: Patient Tolerated treatment well  Activity Tolerance: mild shortness of breath ambulating community distances     Patient Diagnosis(es): The primary encounter diagnosis was Unstable angina pectoris ([address] Diagnoses of NSTEMI (non-ST elevated myocardial infarction) ([address] Hypomagnesemia, and Primary hypertension were also pertinent to this visit. has a past medical history of Anemia, Bleeding stomach ulcer, Hiatal hernia, Hyperlipidemia, Hypertension, Inguinal hernia, Kidney calculus, and Sigmoid diverticulosis. has a past surgical history that includes Hysterectomy (age 33'mayuri); Appendectomy (age 33'mayuri); Tonsillectomy and adenoidectomy (child); and Upper gastrointestinal endoscopy (9/2014). Restrictions  Restrictions/Precautions  Restrictions/Precautions: Fall Risk  Subjective   General  Chart Reviewed: Yes  Additional Pertinent Hx: Dion Burris is an 45-year-old lady with a past medical history significant for hyperlipidemia, hypertension, PUD who presented to the emergency department yesterday for evaluation of chest pain and palpitations. After dinner yesterday evening, patient experienced heart racing, shortness of breath and chest pain with radiation down her left arm. She said this occurred at rest.  She said that she had been under stress yesterday as her pet is in the hospital.  She reported the symptoms lasted about 30 minutes before presenting to the emergency department. She is not currently experiencing chest pain. She does not report any fever, chills, cough, PND, thought apnea, lower extremity swelling, abdominal pain, nausea, vomiting, diarrhea. She has been adherent with her home medication regimen. Response To Previous Treatment: Patient with no complaints from previous session. Referring Practitioner: Dr. Maria R Thompson: Pt denies chest pain. Agreeable to therapy. Asking about her diagnosis and severe her heart attack was - referred her to speak to cardiology. Orientation  Orientation  Overall Orientation Status: Within Normal Limits  Cognition      Objective   Bed mobility  Supine to Sit: Independent  Sit to Supine: Independent  Transfers  Sit to Stand: Independent  Stand to sit: Independent  Ambulation  Ambulation?: Yes  Ambulation 1  Surface: level tile  Device: No Device  Assistance: Modified Independent; Independent  Quality of Gait: moderate pace, step-through pattern, no instability, no c/o dizziness or chest pain.   Distance: 150' with several turns  Comments: IV in tow per PT  Stairs/Curb  Stairs?: No                  Other Activities: Other (see comment)  Comment: RN, , in at end of session to address IV which was pulled out with patient transitioning out of bed. Confirmed that patient will be discharged later today. Patient reports that she will return home with her sister and see no need for continued therapy at this time. AM-PAC Score  AM-PAC Inpatient Mobility Raw Score : 22 (01/05/22 1352)  AM-PAC Inpatient T-Scale Score : 53.28 (01/05/22 1352)  Mobility Inpatient CMS 0-100% Score: 20.91 (01/05/22 1352)  Mobility Inpatient CMS G-Code Modifier : CJ (01/05/22 1352)          Goals  Short term goals  Time Frame for Short term goals: In 2-3 days pt will perform  Short term goal 1: Ambulation 150' without device, (I) - met - 1/5/22  Short term goal 2: Ascend/Descend 2 steps with supervision  Patient Goals   Patient goals : To return directly home    Plan    Plan  Times per week: 1-2 more sessions  Specific instructions for Next Treatment: Assess mobility post-cath  Plan Comment: patient denies need for further PT at home upon discharge, feels that she will manage without difficulties and has her sister to assist as needed as lived together prior  Safety Devices  Type of devices:  All fall risk precautions in place,Call light within reach,Left in bed,Nurse notified,Gait belt  Restraints  Initially in place: No     Therapy Time   Individual Concurrent Group Co-treatment   Time In 1315         Time Out 1340         Minutes 25         Timed Code Treatment Minutes: [address], PT [address][id number]

## 2022-01-05 NOTE — PLAN OF CARE
Problem: SAFETY  Goal: Free from accidental physical injury  1/5/2022 1044 by Jennifer Mcduffie RN  Outcome: Ongoing  1/5/2022 0126 by Benita Bautista RN  Outcome: Ongoing  Goal: Free from intentional harm  1/5/2022 1044 by Jennifer Mcduffie RN  Outcome: Ongoing  1/5/2022 0126 by Benita Bautista RN  Outcome: Ongoing     Problem: DAILY CARE  Goal: Daily care needs are met  1/5/2022 1044 by Jennifer Mcduffie RN  Outcome: Ongoing  1/5/2022 0126 by Benita Bautista RN  Outcome: Ongoing     Problem: PAIN  Goal: Patient's pain/discomfort is manageable  1/5/2022 1044 by Jennifer Mcduffie RN  Outcome: Ongoing  1/5/2022 0126 by Benita Bautista RN  Outcome: Ongoing     Problem: SKIN INTEGRITY  Goal: Skin integrity is maintained or improved  1/5/2022 1044 by Jennifer Mcduffie RN  Outcome: Ongoing  1/5/2022 0126 by Benita Bautista RN  Outcome: Ongoing     Problem: KNOWLEDGE DEFICIT  Goal: Patient/S.O. demonstrates understanding of disease process, treatment plan, medications, and discharge instructions.   1/5/2022 1044 by Jennifer Mcduffie RN  Outcome: Ongoing  1/5/2022 0126 by Benita Bautista RN  Outcome: Ongoing

## 2022-01-05 NOTE — DISCHARGE INSTR - COC
Continuity of Care Form    Patient Name: Susanna Bo   :  1935  MRN:  [id number]    Admit date:  1/3/2022  Discharge date:  ***    Code Status Order: Full Code   Advance Directives:      Admitting Physician:  No admitting provider for patient encounter.   PCP: Drea Colbert MD    Discharging Nurse: [facility] Unit/Room#: S8P-[id number]/9572-96  Discharging Unit Phone Number: ***    Emergency Contact:   Extended Emergency Contact Information  Primary Emergency Contact: Negra Cabrera  Address: [address]           [address] Phone: [phone]  Relation: Brother/Sister  Secondary Emergency Contact: [address] Phone: [phone]  Relation: Child    Past Surgical History:  Past Surgical History:   Procedure Laterality Date    APPENDECTOMY  age 33'mayuri    HYSTERECTOMY  age 33'mayuri    TONSILLECTOMY AND ADENOIDECTOMY  child    UPPER GASTROINTESTINAL ENDOSCOPY  2014    bleeding stomach ulcer       Immunization History:   Immunization History   Administered Date(s) Administered    COVID-19, Pfizer, PF, 30mcg/0.3mL 2021, 2021    Influenza 10/25/2010    Influenza, High Dose (Fluzone 65 yrs and older) 2014, 10/15/2015, 10/24/2016, 10/20/2017, 10/23/2018    Influenza, Loletta Flatten, adjuvanted, 65 yrs +, IM, PF (Fluad) 10/14/2020, 10/18/2021    Influenza, Triv, inactivated, subunit, adjuvanted, IM (Fluad 65 yrs and older) 10/03/2019    Pneumococcal Conjugate 13-valent (Adam Grain) 10/15/2015    Pneumococcal Polysaccharide ([id number]) 2010       Active Problems:  Patient Active Problem List   Diagnosis Code    Hyperlipidemia E78.5    HTN (hypertension) I10    Thyroid mass E07.9       Isolation/Infection:   Isolation            No Isolation          Patient Infection Status       None to display            Nurse Assessment:  Last Vital Signs: BP (!) 149/69   Pulse 92   Temp 97.5 °F (36.4 °C) (Oral)   Resp 19 Ht 5' (1.524 m)   Wt 134 lb 7.7 oz (61 kg)   SpO2 96%   BMI 26.26 kg/m²     Last documented pain score (0-10 scale): Pain Level: 0  Last Weight:   Wt Readings from Last 1 Encounters:   22 134 lb 7.7 oz (61 kg)     Mental Status:  {IP PT MENTAL STATUS:}    IV Access:  { ELLIE IV ACCESS:226839251}    Nursing Mobility/ADLs:  Walking   {CHP DME HVZ}  Transfer  {CHP DME GQXV:133400036}  Bathing  {CHP DME BAEN:351891832}  Dressing  {CHP DME OORT:260008040}  Toileting  {CHP DME MIBV:816624840}  Feeding  {CHP DME LZCK:659902405}  Med Admin  {CHP DME QXPF:223190491}  Med Delivery   { ELLIE MED Delivery:792138208}    Wound Care Documentation and Therapy:        Elimination:  Continence: Bowel: {YES / IJ:46204}  Bladder: {YES / ZE:32456}  Urinary Catheter: {Urinary Catheter:378713151}   Colostomy/Ileostomy/Ileal Conduit: {YES / AT:60742}       Date of Last BM: ***    Intake/Output Summary (Last 24 hours) at 2022 1624  Last data filed at 2022 1544  Gross per 24 hour   Intake 3247.64 ml   Output --   Net 3247.64 ml     I/O last 3 completed shifts: In: 2628.4 [P.O.:660; I.V.:1929.1;  IV Piggyback:39.3]  Out: -     Safety Concerns:     { ELLIE Safety Concerns:850409561}    Impairments/Disabilities:      { ELLIE Impairments/Disabilities:359226158}    Nutrition Therapy:  Current Nutrition Therapy:   [address] ELLIE Diet List:778186311}    Routes of Feeding: {P DME Other Feedings:248917013}  Liquids: {Slp liquid thickness:42454}  Daily Fluid Restriction: {CHP DME Yes amt example:560977434}  Last Modified Barium Swallow with Video (Video Swallowing Test): {Done Not Done AXGK:773250471}    Treatments at the Time of Hospital Discharge:   Respiratory Treatments: ***  Oxygen Therapy:  {Therapy; copd oxygen:29706}  Ventilator:    {DENIA JACOB Vent BSYJ:074575140}    Rehab Therapies: {THERAPEUTIC INTERVENTION:7246475331}  Weight Bearing Status/Restrictions: {DENIA JACOB Weight Bearin}  Other Medical Equipment (for information only, NOT a DME order):  {EQUIPMENT:158675120}  Other Treatments: ***    Patient's personal belongings (please select all that are sent with patient):  {CHP DME Belongings:344686587}    RN SIGNATURE:  {Esignature:418301370}    CASE MANAGEMENT/SOCIAL WORK SECTION    Inpatient Status Date: ***    Readmission Risk Assessment Score:  Readmission Risk              Risk of Unplanned Readmission:  9           Discharging to Facility/ Agency   Name:   Address:  Phone:  Fax:    Dialysis Facility (if applicable)   Name:  Address:  Dialysis Schedule:  Phone:  Fax:    / signature: {Esignature:041678943}    PHYSICIAN SECTION    Prognosis: {Prognosis:2039931652}    Condition at Discharge: [address] [place] [address] Patient Condition:885018776}    Rehab Potential (if transferring to Rehab): {Prognosis:8778948373}    Recommended Labs or Other Treatments After Discharge: ***    Physician Certification: I certify the above information and transfer of Paulina Burris  is necessary for the continuing treatment of the diagnosis listed and that she requires {Admit to Appropriate Level of Care:83890} for {GREATER/LESS:018659282} 30 days.      Update Admission H&P: {CHP DME Changes in [facility]:624722586}    PHYSICIAN SIGNATURE:  {Esignature:182430740}

## 2022-01-05 NOTE — PROGRESS NOTES
[facility]   Daily Progress Note      Admit Date:  1/3/2022    Reason for follow up visit: Chest pain    CC: \"I feel good today. \"    81 y/o female with PMH significant for HTN, HLP who was admitted with chest and L arm pain. Her troponin assay was slightly elevated and she underwent cardiac cath which demonstrated normal coronary arteries. Echo showed LVEF 65%. She has been ambulated and pn os for discharge today. Interval History:  Pt. seen and examined; records reviewed  K+ 3.0 and Mg 1.70 today  K+ supplement given  Denies chest pain or SOB    Subjective:  Pt with no acute overnight cardiac events. Denies chest pain, SOB, cough, palpitations or dizziness    Review of Systems:   · Constitutional: no unanticipated weight loss. There's been no change in energy level, sleep pattern, or activity level. No fevers, chills. · Eyes: No visual changes or diplopia. No scleral icterus. · ENT: No Headaches, hearing loss or vertigo. No mouth sores or sore throat. · Cardiovascular: as reviewed in HPI  · Respiratory: No cough or wheezing, no sputum production. No hematemesis. · Gastrointestinal: No abdominal pain, appetite loss, blood in stools. No change in bowel or bladder habits. · Genitourinary: No dysuria, trouble voiding, or hematuria. · Musculoskeletal:  No gait disturbance, no joint complaints. · Integumentary: No rash or pruritis. · Neurological: No headache, diplopia, change in muscle strength, numbness or tingling. · Psychiatric: No anxiety or depression. · Endocrine: No temperature intolerance. No excessive thirst, fluid intake, or urination. No tremor. · Hematologic/Lymphatic: No abnormal bruising or bleeding, blood clots or swollen lymph nodes. · Allergic/Immunologic: No nasal congestion or hives.     Objective:   BP (!) 145/80   Pulse 88   Temp 98.3 °F (36.8 °C) (Oral)   Resp 16   Ht 5' (1.524 m)   Wt 134 lb 7.7 oz (61 kg)   SpO2 94%   BMI 26.26 kg/m²     Intake/Output Summary (Last 24 hours) at 1/5/2022 1240  Last data filed at 1/5/2022 0930  Gross per 24 hour   Intake 2757.64 ml   Output    Net 2757.64 ml     Wt Readings from Last 3 Encounters:   01/05/22 134 lb 7.7 oz (61 kg)   12/28/21 134 lb (60.8 kg)   11/22/21 136 lb (61.7 kg)       Physical Exam:  General: In no acute distress. Awake, alert, and oriented X4. Up in room  Skin:  Warm and dry. No new appearing rashes or lesions. Neck:  Supple. No JVD or carotid bruit appreciated. Chest: Lungs clear to auscultation. No wheezes/rhonchi/rales  Cardiovascular:  RRR. Normal S1 and S2. No murmur/gallop or rub  Abdomen:  soft, nontender, nondistended, +bowel sounds. Extremities:  No LE edema. No clubbing or cyanosis. 2+ bilateral radial/DP/PT pulses. R radial site unremarkable. Cap refill brisk    Medications:    potassium chloride  40 mEq Oral Daily    atorvastatin  20 mg Oral Daily    lisinopril-hydroCHLOROthiazide  2 tablet Oral Daily    clopidogrel  75 mg Oral Daily    pantoprazole  40 mg Oral QAM AC    sodium chloride flush  5-40 mL IntraVENous 2 times per day      sodium chloride      sodium chloride 50 mL/hr at 01/05/22 0545    sodium chloride      sodium chloride       sodium chloride, ondansetron **OR** ondansetron, acetaminophen **OR** acetaminophen, polyethylene glycol, magnesium sulfate, sodium chloride, iopamidol, sodium chloride flush, sodium chloride, acetaminophen    Lab Data:  CBC:   Recent Labs     01/03/22 1910 01/04/22  0457 01/05/22  0548   WBC 7.0 5.9 11.0   HGB 14.0 13.3 13.6    189 207     BMP:    Recent Labs     01/03/22  1910 01/05/22  0548    140   K 3.5 3.0*   CO2 26 25   BUN 31* 15   CREATININE 1.0 0.6     LIVR:   Recent Labs     01/03/22 1910   AST 23   ALT 9*     INR:  No results for input(s): INR in the last 72 hours. APTT:   Recent Labs     01/03/22  2300 01/04/22 0457   APTT 32.1 120.1*     Results for MINOR, Humberto Lama (MRN [id number]) as of 1/5/2022 12:39   Ref. Range 1/3/2022 19:10 1/3/2022 21:55 1/4/2022 02:06 1/4/2022 04:57 1/4/2022 07:32   Troponin Latest Ref Range: <0.01 ng/mL <0.01 0.04 (H) 0.03 (H)  0.04 (H)   Cholesterol, Total Latest Ref Range: 0 - 199 mg/dL    134    HDL Cholesterol Latest Ref Range: 40 - 60 mg/dL    57    LDL Calculated Latest Ref Range: <100 mg/dL    65    Triglycerides Latest Ref Range: 0 - 150 mg/dL    60    VLDL Cholesterol Calculated Latest Ref Range: Not Established mg/dL    12      Echo 1/4/2022:  Normal left ventricle size, wall thickness, and systolic function with an   estimated ejection fraction of 65-70%. No regional wall motion abnormalities   are seen. The right ventricle is normal in size and function. The left atrium is mildly dilated. Trivial mitral and tricuspid regurgitation. The ascending aorta is mildly dilated measuring 3.9 cm with limited   visualization. 1/4/2022 LHC:  HEMODYNAMICS:  Aortic pressure was 130/80     ANGIOGRAM/CORONARY ARTERIOGRAM:     The left main coronary artery is normal .  The left anterior descending artery is normal .  The left circumflex artery is normal .  The right coronary artery is normal .    SUMMARY:   Normal coronary arteries     Telemetry: Sinus rhythm with occasional PVC    Assessment/Plan:    1. Elevated troponin/Unstable angina  -normal coronaries on cath  -LVEF 65%  -no recurrent chest pain  -continue antiplatelet, statin, ACE  -risk factor modification    2. Essential HTN  -BP @ goal  -well controlled  -continue medical management    3. Hyperlipidemia  -on statin    4.  Hypokalemia/Hypomagnesemia  -continue K+ replacement  -Magnesium replacement  -follow up labs as an outpatient    Stable and okay to discharge from cardiac standpoint    Follow up with Dr. Nuvia Lopez in [place] office on 1/21/2022 @ 10:15 AM    Post cardiac cath instructions discussed with patient    Check BMP and magnesium level in 1 week    Discharge meds:  Atorvastatin 20 mg daily  Plavix 75 mg daily  Lisinopril/HCTZ 20/25 mg daily  Pantoprazole 40 mg daily  Klor Con 40 mEq daily      Electronically signed by JACQUELINE Medina CNP on 1/5/2022 at 12:40 PM

## 2022-01-05 NOTE — CARE COORDINATION
CASE MANAGEMENT DISCHARGE SUMMARY:    DISCHARGE DATE: 1/5/22    DISCHARGED TO HOME     TRANSPORTATION: sister  No home needs, lives with sister, no other concerns  Electronically signed by [email or web address] AGUS Hardin on 1/5/2022 at 3:00 PM

## 2022-01-05 NOTE — PROGRESS NOTES
IV removed. Telemetry removed. Discharge instructions reviewed with pt, all questions and concerns addressed. Verbalized understanding of follow ups needed and where to obtain prescriptions. Pt discharged home with family at this time.

## 2022-01-06 ENCOUNTER — TELEPHONE (OUTPATIENT)
Dept: CARDIOLOGY CLINIC | Age: 87
End: 2022-01-06

## 2022-01-06 RX ORDER — POTASSIUM CHLORIDE 1500 MG/1
40 TABLET, FILM COATED, EXTENDED RELEASE ORAL DAILY
Qty: 60 TABLET | Refills: 3 | Status: SHIPPED | OUTPATIENT
Start: 2022-01-06 | End: 2022-05-19 | Stop reason: SDUPTHER

## 2022-01-06 RX ORDER — CLOPIDOGREL BISULFATE 75 MG/1
75 TABLET ORAL DAILY
Qty: 30 TABLET | Refills: 3 | Status: SHIPPED | OUTPATIENT
Start: 2022-01-06

## 2022-01-06 RX ORDER — PANTOPRAZOLE SODIUM 40 MG/1
40 TABLET, DELAYED RELEASE ORAL
Qty: 30 TABLET | Refills: 0 | Status: SHIPPED | OUTPATIENT
Start: 2022-01-06 | End: 2022-01-12 | Stop reason: SDUPTHER

## 2022-01-06 NOTE — TELEPHONE ENCOUNTER
Coretta Burt called in and states that her Rx's were sent to [facility] and they need to be sent to [facility]. Please resend Rx's to correct pharmacy.     potassium chloride (KLOR-CON M)   clopidogrel (PLAVIX)  pantoprazole (PROTONIX)    Correct Pharmacy:  [address] [facility] [address] [facility] [phone] Juani Grey [phone]   [address], [address] [place] [address]   Phone:  [phone]  Fax:  [phone]

## 2022-01-17 NOTE — PROGRESS NOTES
[facility]      Cardiology Consult    Wade Burris  1935 January 21, 2022    Primary Physician: Natty Almendarez MD  Reason for Visit: hospital f/u NSTEMI    CC: \"feeling good, no problems\"    HPI:  The patient is 82 y.o. female with a past medical history significant for hypertension, hyperlipidemia, who presented to [facility] 1/3/22 with chest pressure with associated arm numbness and mild shortness of breath. Pt was found to have NSTEMI. Coronary angiogram showed normal coronaries. Today, the patient denies exertional chest pain/pressure, palpitations, dizziness, syncope, worsening leg swelling and worsening dyspnea. She is able to walk up steps without exertional chest pain. She reports compliance with her medications. Past Medical History:   Diagnosis Date    Anemia 9/2014    Bleeding stomach ulcer     Hiatal hernia 9/2014    small    Hyperlipidemia     Hypertension     Inguinal hernia     no surgery yet.     Kidney calculus     Sigmoid diverticulosis 9/2014     Past Surgical History:   Procedure Laterality Date    APPENDECTOMY  age 33'mayuri   Lm [place] HYSTERECTOMY  age 33'mayuri   Lm [place] TONSILLECTOMY AND ADENOIDECTOMY  child    UPPER GASTROINTESTINAL ENDOSCOPY  9/2014    bleeding stomach ulcer     Family History   Problem Relation Age of Onset    Heart Disease Mother     Diabetes Mother     Cancer Mother     Heart Disease Father     Breast Cancer Sister      Social History     Tobacco Use    Smoking status: Never Smoker    Smokeless tobacco: Never Used   Vaping Use    Vaping Use: Never used   Substance Use Topics    Alcohol use: No    Drug use: No       Allergies   Allergen Reactions    Aspirin Other (See Comments)     Stomach Ulcer    Demerol Hcl [Meperidine] Other (See Comments)     Hard to \"wake up\"    Sulfa Antibiotics      Unsure of reaction     Current Outpatient Medications   Medication Sig Dispense Refill    pantoprazole (PROTONIX) 40 MG tablet Take 1 tablet by mouth every She is oriented to person, place, and time. She appears well-developed and well-nourished. In no acute distress. Head: Normocephalic and atraumatic. Pupils equal and round. Neck: Neck supple. No JVP or carotid bruit appreciated. No mass and no thyromegaly present. No lymphadenopathy present. Cardiovascular: Normal rate. Normal heart sounds. Exam reveals no gallop and no friction rub. No murmur heard. Pulmonary/Chest: Effort normal and breath sounds normal. No respiratory distress. She has no wheezes, rhonchi or rales. Abdominal: Soft, non-tender. Bowel sounds are normal. She exhibits no organomegaly, mass or bruit. Extremities: No edema, cyanosis, or clubbing. Pulses are 2+ radial/dorsalis pedis/posterior tibial/carotid bilaterally. Neurological: No gross cranial nerve deficit. Coordination normal.   Skin: Skin is warm and dry. There is no rash or diaphoresis. Psychiatric: She has a normal mood and affect. Her speech is normal and behavior is normal.     Lab Review:   FLP:    Lab Results   Component Value Date    TRIG 60 01/04/2022    HDL 57 01/04/2022    HDL 64 01/04/2012    LDLCALC 65 01/04/2022    LABVLDL 12 01/04/2022     BUN/Creatinine:    Lab Results   Component Value Date    BUN 15 01/05/2022    CREATININE 0.6 01/05/2022       EKG Interpretation:   1/3/22 Normal sinus rhythmPossible Left atrial enlargement. Leftward axisNonspecific ST abnormalityDelayed transition. Image Review:    Echo 1/2022   Normal left ventricle size, wall thickness, and systolic function with an   estimated ejection fraction of 65-70%. No regional wall motion abnormalities   are seen. The right ventricle is normal in size and function. The left atrium is mildly dilated. Trivial mitral and tricuspid regurgitation. The ascending aorta is mildly dilated measuring 3.9 cm with limited   visualization. [facility] 1/2022 Normal coronaries. Assessment/Plan:     NSTEMI. Denies recurrent angina symptoms.    Cath showed normal coronaries, LVEF normal.   Hx bleeding ulcer- not on ASA  Currently taking plavix. May stop plavix after 3 months. Essential hypertension. Controlled. Goal BP <130/80. Continue medical therapy. HLD. LDL 65 1/2022  Continue statin. F/u in office in 1 year  She has received her COVID vaccinations and booster as well as her flu vaccination. Thank you very much for allowing me to participate in the care of your patient. Please do not hesitate to contact me if you have any questions. Sincerely,  Dilcia Thomas MD      [facility], [address] [place] [address]  Ph: [phone]  Fax: [phone]    This note was scribed in the presence of the physician by Vani Kimbrough RN.

## 2022-01-21 ENCOUNTER — OFFICE VISIT (OUTPATIENT)
Dept: CARDIOLOGY CLINIC | Age: 87
End: 2022-01-21
Payer: MEDICARE

## 2022-01-21 VITALS
BODY MASS INDEX: 25.32 KG/M2 | HEART RATE: 82 BPM | HEIGHT: 60 IN | WEIGHT: 129 LBS | OXYGEN SATURATION: 97 % | SYSTOLIC BLOOD PRESSURE: 128 MMHG | DIASTOLIC BLOOD PRESSURE: 68 MMHG

## 2022-01-21 DIAGNOSIS — I21.4 NSTEMI (NON-ST ELEVATED MYOCARDIAL INFARCTION) (HCC): ICD-10-CM

## 2022-01-21 DIAGNOSIS — I10 PRIMARY HYPERTENSION: Primary | ICD-10-CM

## 2022-01-21 DIAGNOSIS — E78.00 PURE HYPERCHOLESTEROLEMIA: ICD-10-CM

## 2022-01-21 PROCEDURE — 1036F TOBACCO NON-USER: CPT | Performed by: INTERNAL MEDICINE

## 2022-01-21 PROCEDURE — 1090F PRES/ABSN URINE INCON ASSESS: CPT | Performed by: INTERNAL MEDICINE

## 2022-01-21 PROCEDURE — G8427 DOCREV CUR MEDS BY ELIG CLIN: HCPCS | Performed by: INTERNAL MEDICINE

## 2022-01-21 PROCEDURE — 1111F DSCHRG MED/CURRENT MED MERGE: CPT | Performed by: INTERNAL MEDICINE

## 2022-01-21 PROCEDURE — G8484 FLU IMMUNIZE NO ADMIN: HCPCS | Performed by: INTERNAL MEDICINE

## 2022-01-21 PROCEDURE — G8417 CALC BMI ABV UP PARAM F/U: HCPCS | Performed by: INTERNAL MEDICINE

## 2022-01-21 PROCEDURE — 4040F PNEUMOC VAC/ADMIN/RCVD: CPT | Performed by: INTERNAL MEDICINE

## 2022-01-21 PROCEDURE — G8399 PT W/DXA RESULTS DOCUMENT: HCPCS | Performed by: INTERNAL MEDICINE

## 2022-01-21 PROCEDURE — 99214 OFFICE O/P EST MOD 30 MIN: CPT | Performed by: INTERNAL MEDICINE

## 2022-01-21 PROCEDURE — 1123F ACP DISCUSS/DSCN MKR DOCD: CPT | Performed by: INTERNAL MEDICINE

## 2022-01-21 NOTE — PATIENT INSTRUCTIONS

## 2022-02-07 NOTE — DISCHARGE SUMMARY
[facility] Internal Medicine Discharge Summary      Patient ID: Rod DICKINSON Minor      Patient's PCP: Hadley Lewis MD    Admit Date: 1/3/2022     Discharge Date: 1/5/22  The patient was seen and examined on day of discharge and this discharge summary is in conjunction with any daily progress note from day of discharge. Admitting Physician: Henriette Frankel  Discharge Physician: Hadley Lewis MD     Admitted for   Chief Complaint   Patient presents with    Palpitations     REPORTS HAVING PALPITATIONS ONSET 30 MINS PTA STRANGE FEELING LEFT ARM AND RIGHT EAR/NECK AREA       Admitting Diagnosis Unstable angina pectoris ([facility] [address] [I20.0]  Chest pain [R07.9]  NSTEMI (non-ST elevated myocardial infarction) [facility]) [I21.4]    Discharge Diagnoses: Active Hospital Problems    Diagnosis Date Noted    NSTEMI (non-ST elevated myocardial infarction) ([facility] [address] [I21.4] 01/04/2022    Thyroid mass [E07.9] 01/03/2022    Hyperlipidemia [E78.5] 01/04/2012    HTN (hypertension) [I10] 01/04/2012       Follow Up: Primary Care Physician in one week    PCP to Follow up on   Thyroid mass          Hospital Course:     Chest pain  NSTEMI    She had unremarkable cardiac catheterization. She had a mostly unremarkable echocardiogram.  DAPT X3 mos     PVCs  If she continues to have symptomatic palpitations may start beta-blocker     HLD  Continue statin     Thyroid mass  We will arrange for outpatient ultrasound     Osteoporosis  Vitamin D replacement for bisphosphonate     Hypomagnesemia  Hypokalemia  Replete         Consults:     IP CONSULT TO CARDIOLOGY  IP CONSULT TO PRIMARY CARE PROVIDER  IP CONSULT TO CARDIOLOGY        Disposition: home    Discharged Condition: Stable    Code Status: Prior    Activity: activity as tolerated    Diet: regular diet      Wound Care: none needed    SUBJECTIVE / Interval History:   No change from progress note dated 1/5    Exam:  TEMPERATURE:  Current - Temp: 97.5 °F (36.4 °C);  Max - No data recorded  RESPIRATIONS RANGE: No data recorded  PULSE RANGE: No data recorded  BLOOD PRESSURE RANGE:  No data recorded.  ; No data recorded. PULSE OXIMETRY RANGE: No data recorded  24HR INTAKE/OUTPUT:  No intake or output data in the 24 hours ending 02/06/22 2331   Wt Readings from Last 1 Encounters:   01/21/22 129 lb (58.5 kg)     BMI Readings from Last 1 Encounters:   01/21/22 25.19 kg/m²     General: Well-appearing  HEENT: PERRL, EOMI, moist mucosa, arcus senilis  Cardiac: Regular rate and rhythm, warm extremities, no peripheral edema. Right radial wrist without bruit, no hematoma  Lungs well dressed bilaterally  Abdomen: Soft nontender nondistended  Musculoskeletal: Strength grossly intact, no joint effusions  Neuro: Nonfocal    Labs: For convenience and continuity at follow-up the following most recent labs are provided:    CBC:   Lab Results   Component Value Date    WBC 11.0 01/05/2022    HGB 13.6 01/05/2022    HCT 41.1 01/05/2022     01/05/2022       RENAL:   Lab Results   Component Value Date     01/05/2022    K 3.0 01/05/2022    K 3.5 01/03/2022     01/05/2022    CO2 25 01/05/2022    BUN 15 01/05/2022    CREATININE 0.6 01/05/2022           Discharge Medications:      Medication List      CONTINUE taking these medications    atorvastatin 20 MG tablet  Commonly known as: LIPITOR  Take 1 tablet by mouth daily     lisinopril-hydroCHLOROthiazide 20-25 MG per tablet  Commonly known as: PRINZIDE;ZESTORETIC  Take 1 tablet by mouth daily     vitamin D 1.25 MG (80052 UT) Caps capsule  Commonly known as: ERGOCALCIFEROL  Take 1 capsule by mouth once a week           Where to Get Your Medications      These medications were sent to [address] [place] [address] & [place] [address] [address], [address] [place] [id number]    Phone: [phone]   · vitamin D 1.25 MG (49026 UT) Caps capsule         No future appointments.     Time Spent on discharge is more than 20 minutes in the examination, evaluation, counseling and review of medications and discharge plan. Signed:  Darren Urias MD   2/6/2022    The note was completed using EMR. Every effort was made to ensure accuracy; however, inadvertent computerized transcription errors may be present.

## 2023-03-10 ENCOUNTER — OFFICE VISIT (OUTPATIENT)
Dept: CARDIOLOGY CLINIC | Age: 88
End: 2023-03-10

## 2023-03-10 VITALS
BODY MASS INDEX: 25.72 KG/M2 | HEART RATE: 74 BPM | SYSTOLIC BLOOD PRESSURE: 136 MMHG | WEIGHT: 131 LBS | HEIGHT: 60 IN | OXYGEN SATURATION: 98 % | DIASTOLIC BLOOD PRESSURE: 64 MMHG

## 2023-03-10 DIAGNOSIS — R00.0 RACING HEART BEAT: ICD-10-CM

## 2023-03-10 DIAGNOSIS — R00.2 PALPITATIONS: ICD-10-CM

## 2023-03-10 DIAGNOSIS — I21.4 NSTEMI (NON-ST ELEVATED MYOCARDIAL INFARCTION) (HCC): Primary | ICD-10-CM

## 2023-03-10 DIAGNOSIS — E04.9 THYROID GOITER: ICD-10-CM

## 2023-03-10 DIAGNOSIS — I49.3 PVC (PREMATURE VENTRICULAR CONTRACTION): ICD-10-CM

## 2023-03-10 DIAGNOSIS — E78.00 PURE HYPERCHOLESTEROLEMIA: ICD-10-CM

## 2023-03-10 DIAGNOSIS — I25.10 CORONARY ARTERY DISEASE INVOLVING NATIVE CORONARY ARTERY OF NATIVE HEART WITHOUT ANGINA PECTORIS: ICD-10-CM

## 2023-03-10 DIAGNOSIS — Z79.82 LONG TERM (CURRENT) USE OF ASPIRIN: ICD-10-CM

## 2023-03-10 DIAGNOSIS — I10 PRIMARY HYPERTENSION: ICD-10-CM

## 2023-03-10 NOTE — PROGRESS NOTES
[address]      Cardiology Progress Note    Juanita Burris  1935    March 10, 2023    Primary Physician: Girish Wilson MD  Reason for Visit: hospital f/u NSTEMI    CC: \"I have no heart symptoms. \"     HPI:  The patient is 82 y.o. female with a past medical history significant for hypertension, hyperlipidemia, who presented to [facility] 1/[facility]/22 with chest pressure with associated arm numbness and mild shortness of breath. Pt was found to have NSTEMI. Coronary angiogram showed normal coronaries. Today, she presents for her yearly follow up. She reports she has done [employer] well\" over the last 1 year. She notes occasionally she feels her heart racing/skipped beats but only a few times over the last year. Up and down a full flight of stairs. Yearly labs due. Patient denies exertional chest pain/pressure, dyspnea at rest, MORFIN, PND, orthopnea, palpitations, lightheadedness, weight changes, changes in LE edema, and syncope. She admits to medical therapy compliance and tolerating. Past Medical History:   Diagnosis Date    Anemia 9/2014    Bleeding stomach ulcer     Hiatal hernia 9/2014    small    Hyperlipidemia     Hypertension     Inguinal hernia     no surgery yet.     Kidney calculus     Sigmoid diverticulosis 9/2014     Past Surgical History:   Procedure Laterality Date    APPENDECTOMY  age 33'mayuri    HYSTERECTOMY (CERVIX STATUS UNKNOWN)  age 33'mayuri    TONSILLECTOMY AND ADENOIDECTOMY  child    UPPER GASTROINTESTINAL ENDOSCOPY  9/2014    bleeding stomach ulcer     Family History   Problem Relation Age of Onset    Heart Disease Mother     Diabetes Mother     Cancer Mother     Heart Disease Father     Breast Cancer Sister      Social History     Tobacco Use    Smoking status: Never    Smokeless tobacco: Never   Vaping Use    Vaping Use: Never used   Substance Use Topics    Alcohol use: No    Drug use: No       Allergies   Allergen Reactions    Aspirin Other (See Comments)     Stomach Ulcer    Demerol Hcl [Meperidine] Other (See Comments)     Hard to \"wake up\"    Sulfa Antibiotics      Unsure of reaction     Current Outpatient Medications   Medication Sig Dispense Refill    pantoprazole (PROTONIX) 40 MG tablet Take 1 tablet by mouth every morning (before breakfast) 90 tablet 1    alendronate (FOSAMAX) 70 MG tablet Take 1 tablet by mouth every 7 days 12 tablet 1    potassium chloride (KLOR-CON M) 20 MEQ TBCR extended release tablet Take 2 tablets by mouth daily 60 tablet 3    atorvastatin (LIPITOR) 20 MG tablet TAKE ONE TABLET BY MOUTH DAILY 90 tablet 3    lisinopril-hydroCHLOROthiazide (PRINZIDE;ZESTORETIC) 20-25 MG per tablet Take 1 tablet by mouth daily 90 tablet 3    clopidogrel (PLAVIX) 75 MG tablet Take 1 tablet by mouth daily 30 tablet 3    vitamin D (ERGOCALCIFEROL) 1.25 MG (28685 UT) CAPS capsule Take 1 capsule by mouth once a week 12 capsule 0     No current facility-administered medications for this visit. Review of Systems:  Constitutional: no unanticipated weight loss. There's been no change in energy level, sleep pattern, or activity level. No fevers, chills. Eyes: No visual changes or diplopia. No scleral icterus. ENT: No Headaches, hearing loss or vertigo. No mouth sores or sore throat. Cardiovascular: as reviewed in HPI  Respiratory: No cough or wheezing, no sputum production. No hematemesis. Gastrointestinal: No abdominal pain, appetite loss, blood in stools. No change in bowel or bladder habits. Genitourinary: No dysuria, trouble voiding, or hematuria. Musculoskeletal:  No gait disturbance, no joint complaints. Integumentary: No rash or pruritis. Neurological: No headache, diplopia, change in muscle strength, numbness or tingling. Psychiatric: No anxiety or depression. Endocrine: No temperature intolerance. No excessive thirst, fluid intake, or urination. No tremor.   Hematologic/Lymphatic: No abnormal bruising or bleeding, blood clots or swollen lymph nodes. Allergic/Immunologic: No nasal congestion or hives. Physical Exam:   /64   Pulse 74   Ht 5' (1.524 m)   Wt 131 lb (59.4 kg)   SpO2 98%   BMI 25.58 kg/m²   Wt Readings from Last 3 Encounters:   03/10/23 131 lb (59.4 kg)   05/19/22 133 lb (60.3 kg)   01/21/22 129 lb (58.5 kg)     Constitutional: She is oriented to person, place, and time. She appears well-developed and well-nourished. In no acute distress. Head: Normocephalic and atraumatic. Pupils equal and round. Neck: Neck supple. No JVP or carotid bruit appreciated. No mass and no thyromegaly present. No lymphadenopathy present. Cardiovascular: Normal rate. Normal heart sounds. Exam reveals no gallop and no friction rub. No murmur heard. Pulmonary/Chest: Effort normal and breath sounds normal. No respiratory distress. She has no wheezes, rhonchi or rales. Abdominal: Soft, non-tender. Bowel sounds are normal. She exhibits no organomegaly, mass or bruit. Extremities: No edema, cyanosis, or clubbing. Pulses are 2+ radial/dorsalis pedis/posterior tibial/carotid bilaterally. Neurological: No gross cranial nerve deficit. Coordination normal.   Skin: Skin is warm and dry. There is no rash or diaphoresis. Psychiatric: She has a normal mood and affect. Her speech is normal and behavior is normal.     Lab Review:   Lab Results   Component Value Date/Time    TRIG 60 01/04/2022 04:57 AM    HDL 57 01/04/2022 04:57 AM    HDL 64 01/04/2012 09:39 AM    LDLCALC 65 01/04/2022 04:57 AM    LABVLDL 12 01/04/2022 04:57 AM     Lab Results   Component Value Date/Time    BUN 19 05/19/2022 10:06 AM    CREATININE 0.6 05/19/2022 10:06 AM       EKG Interpretation:   1/3/22 Normal sinus rhythmPossible Left atrial enlargement. Leftward axisNonspecific ST abnormalityDelayed transition. 3/10/23: Sinus  Rhythm  -Frequent pvcs -ventricular trigeminy, Low voltage in precordial leads. -RSR(V1) -nondiagnostic.  -Right atrial enlargement.  poor r wave progression Image Review:    Echo 1/2022   Normal left ventricle size, wall thickness, and systolic function with an   estimated ejection fraction of 65-70%. No regional wall motion abnormalities   are seen. The right ventricle is normal in size and function. The left atrium is mildly dilated. Trivial mitral and tricuspid regurgitation. The ascending aorta is mildly dilated measuring 3.9 cm with limited   visualization. [facility] 1/2022 Normal coronaries. Assessment/Plan:     CAD s/p NSTEMI 1/2022.   1/2022Cath showed normal coronaries, LVEF normal.   Hx bleeding ulcer- not on ASA. Finished Plavix 3 month therapy. Denies recurrent angina symptoms today over last 1 year. She is staying active  Her physical exam is normal except skipped heart beats  Reviewed labs 2022  She remains on lipitor, prinzide   Repeat fasting labs CMP, lipid     PVC's   Reports the occasionally heart racing/palpitations with full flight of stairs only. Occurred a few times over last 1 year. Per physical exam and per EKG today. Reviewed labs from 2022  On KCL  Will repeat CMP, CBC, TSH with reflex. Essential hypertension. Goal BP <130/80. Controlled on medical therapy   Continue medical therapy. Labs routinely ordered. HLD. LDL 65 1/2022  No myalgias  Continue statin. Repeat fasting CMP, lipid  LDL goal  55 -70  We will call with lab results and plan follow up in office in 1 year. Thank you very much for allowing me to participate in the care of your patient. Please do not hesitate to contact me if you have any questions. Sincerely,  Susan Siegel MD      [address] [place] [address]  Ph: [phone]  Fax: [phone]    This note was scribed in the presence of Dr. Lachelle Driscoll MD by Zach Lorenzana, STEFANI. Physician Attestation:  The scribes documentation has been prepared under my direction and personally reviewed by me in its entirety.      I confirm that the note above accurately reflects all work, treatment, procedures, and medical decision making performed by me.

## 2023-03-15 DIAGNOSIS — E78.00 PURE HYPERCHOLESTEROLEMIA: ICD-10-CM

## 2023-03-15 DIAGNOSIS — I21.4 NSTEMI (NON-ST ELEVATED MYOCARDIAL INFARCTION) (HCC): ICD-10-CM

## 2023-03-15 DIAGNOSIS — I25.10 CORONARY ARTERY DISEASE INVOLVING NATIVE CORONARY ARTERY OF NATIVE HEART WITHOUT ANGINA PECTORIS: ICD-10-CM

## 2023-03-15 DIAGNOSIS — E04.9 THYROID GOITER: ICD-10-CM

## 2023-03-15 DIAGNOSIS — I10 PRIMARY HYPERTENSION: ICD-10-CM

## 2023-03-15 LAB
ALBUMIN SERPL-MCNC: 4.2 G/DL (ref 3.4–5)
ALBUMIN/GLOB SERPL: 1.9 {RATIO} (ref 1.1–2.2)
ALP SERPL-CCNC: 83 U/L (ref 40–129)
ALT SERPL-CCNC: 8 U/L (ref 10–40)
ANION GAP SERPL CALCULATED.3IONS-SCNC: 12 MMOL/L (ref 3–16)
AST SERPL-CCNC: 17 U/L (ref 15–37)
BILIRUB SERPL-MCNC: 0.6 MG/DL (ref 0–1)
BUN SERPL-MCNC: 27 MG/DL (ref 7–20)
CALCIUM SERPL-MCNC: 10.2 MG/DL (ref 8.3–10.6)
CHLORIDE SERPL-SCNC: 102 MMOL/L (ref 99–110)
CHOLEST SERPL-MCNC: 157 MG/DL (ref 0–199)
CO2 SERPL-SCNC: 27 MMOL/L (ref 21–32)
CREAT SERPL-MCNC: 0.7 MG/DL (ref 0.6–1.2)
DEPRECATED RDW RBC AUTO: 13.7 % (ref 12.4–15.4)
GFR SERPLBLD CREATININE-BSD FMLA CKD-EPI: >60 ML/MIN/{1.73_M2}
GLUCOSE SERPL-MCNC: 97 MG/DL (ref 70–99)
HCT VFR BLD AUTO: 43.9 % (ref 36–48)
HDLC SERPL-MCNC: 59 MG/DL (ref 40–60)
HGB BLD-MCNC: 14.3 G/DL (ref 12–16)
LDL CHOLESTEROL CALCULATED: 78 MG/DL
LDLC SERPL-MCNC: 85 MG/DL
MCH RBC QN AUTO: 29.7 PG (ref 26–34)
MCHC RBC AUTO-ENTMCNC: 32.6 G/DL (ref 31–36)
MCV RBC AUTO: 91.2 FL (ref 80–100)
PLATELET # BLD AUTO: 197 K/UL (ref 135–450)
PMV BLD AUTO: 9.9 FL (ref 5–10.5)
POTASSIUM SERPL-SCNC: 4.1 MMOL/L (ref 3.5–5.1)
PROT SERPL-MCNC: 6.4 G/DL (ref 6.4–8.2)
RBC # BLD AUTO: 4.82 M/UL (ref 4–5.2)
SODIUM SERPL-SCNC: 141 MMOL/L (ref 136–145)
TRIGL SERPL-MCNC: 101 MG/DL (ref 0–150)
TSH SERPL DL<=0.005 MIU/L-ACNC: 3.03 UIU/ML (ref 0.27–4.2)
VLDLC SERPL CALC-MCNC: 20 MG/DL
WBC # BLD AUTO: 5.9 K/UL (ref 4–11)

## 2024-03-25 ENCOUNTER — TELEPHONE (OUTPATIENT)
Dept: CARDIOLOGY CLINIC | Age: 89
End: 2024-03-25

## 2024-04-26 NOTE — PROGRESS NOTES
Patient is alert and oriented in the bed. Patient NPO for testing. Patient has no complaints at this time. Ambulating stand by assist x 1. Call light within reach. Able to make needs known. Fall precautions in place. Will monitor.  Electronically signed by Mathew Bello on 1/4/2022 at 1:45 PM The patient's goals for the shift include pain management    The clinical goals for the shift include pt will remain HDS    Problem: Pain  Goal: Takes deep breaths with improved pain control throughout the shift  Outcome: Progressing  Goal: Turns in bed with improved pain control throughout the shift  Outcome: Progressing  Goal: Free from acute confusion related to pain meds throughout the shift  Outcome: Progressing     Problem: Fall/Injury  Goal: Not fall by end of shift  Outcome: Progressing  Goal: Be free from injury by end of the shift  Outcome: Progressing  Goal: Verbalize understanding of personal risk factors for fall in the hospital  Outcome: Progressing  Goal: Verbalize understanding of risk factor reduction measures to prevent injury from fall in the home  Outcome: Progressing     Problem: Skin  Goal: Participates in plan/prevention/treatment measures  Outcome: Progressing  Flowsheets (Taken 4/24/2024 0318 by Tha Pascual RN)  Participates in plan/prevention/treatment measures: Elevate heels  Goal: Prevent/manage excess moisture  Outcome: Progressing  Flowsheets (Taken 4/24/2024 0318 by Tha Pascual, RN)  Prevent/manage excess moisture: Cleanse incontinence/protect with barrier cream  Goal: Prevent/minimize sheer/friction injuries  Outcome: Progressing  Flowsheets (Taken 4/24/2024 0318 by Tha Pascual RN)  Prevent/minimize sheer/friction injuries:   HOB 30 degrees or less   Turn/reposition every 2 hours/use positioning/transfer devices

## 2024-04-26 NOTE — PROGRESS NOTES
tobacco: Never   Vaping Use    Vaping Use: Never used   Substance Use Topics    Alcohol use: No    Drug use: No       Allergies   Allergen Reactions    Aspirin Other (See Comments)     Stomach Ulcer    Demerol Hcl [Meperidine] Other (See Comments)     Hard to \"wake up\"    Sulfa Antibiotics      Unsure of reaction     Current Outpatient Medications   Medication Sig Dispense Refill    pantoprazole (PROTONIX) 40 MG tablet Take 1 tablet by mouth every morning (before breakfast) 90 tablet 2    alendronate (FOSAMAX) 70 MG tablet Take 1 tablet by mouth every 7 days 12 tablet 2    atorvastatin (LIPITOR) 20 MG tablet Take 1 tablet by mouth daily 90 tablet 3    potassium chloride (KLOR-CON M20) 20 MEQ extended release tablet Take 2 tablets by mouth daily 180 tablet 0    lisinopril-hydroCHLOROthiazide (PRINZIDE;ZESTORETIC) 20-25 MG per tablet Take 1 tablet by mouth daily 90 tablet 3    vitamin D3 (CHOLECALCIFEROL) 25 MCG (1000 UT) TABS tablet Take 1 tablet by mouth daily 90 tablet 2    vitamin D (ERGOCALCIFEROL) 1.25 MG (26392 UT) CAPS capsule Take 1 capsule by mouth once a week 12 capsule 0     No current facility-administered medications for this visit.       Review of Systems:  Constitutional: no unanticipated weight loss. There's been no change in energy level, sleep pattern, or activity level. No fevers, chills.   Eyes: No visual changes or diplopia. No scleral icterus.  ENT: No Headaches, hearing loss or vertigo. No mouth sores or sore throat.  Cardiovascular: as reviewed in HPI  Respiratory: No cough or wheezing, no sputum production. No hematemesis.    Gastrointestinal: No abdominal pain, appetite loss, blood in stools. No change in bowel or bladder habits.  Genitourinary: No dysuria, trouble voiding, or hematuria.  Musculoskeletal:  No gait disturbance, no joint complaints.  Integumentary: No rash or pruritis.  Neurological: No headache, diplopia, change in muscle strength, numbness or tingling.   Psychiatric: No

## 2024-04-29 ENCOUNTER — OFFICE VISIT (OUTPATIENT)
Dept: CARDIOLOGY CLINIC | Age: 89
End: 2024-04-29
Payer: MEDICARE

## 2024-04-29 VITALS
WEIGHT: 130 LBS | HEART RATE: 70 BPM | BODY MASS INDEX: 25.52 KG/M2 | HEIGHT: 60 IN | SYSTOLIC BLOOD PRESSURE: 136 MMHG | OXYGEN SATURATION: 96 % | DIASTOLIC BLOOD PRESSURE: 70 MMHG

## 2024-04-29 DIAGNOSIS — I25.10 CORONARY ARTERY DISEASE INVOLVING NATIVE CORONARY ARTERY OF NATIVE HEART WITHOUT ANGINA PECTORIS: Primary | ICD-10-CM

## 2024-04-29 DIAGNOSIS — I10 PRIMARY HYPERTENSION: ICD-10-CM

## 2024-04-29 DIAGNOSIS — I49.3 PVC (PREMATURE VENTRICULAR CONTRACTION): ICD-10-CM

## 2024-04-29 DIAGNOSIS — E78.5 HYPERLIPIDEMIA, UNSPECIFIED HYPERLIPIDEMIA TYPE: ICD-10-CM

## 2024-04-29 PROCEDURE — 93000 ELECTROCARDIOGRAM COMPLETE: CPT | Performed by: INTERNAL MEDICINE

## 2024-04-29 PROCEDURE — 99214 OFFICE O/P EST MOD 30 MIN: CPT | Performed by: INTERNAL MEDICINE

## 2024-04-29 PROCEDURE — 1036F TOBACCO NON-USER: CPT | Performed by: INTERNAL MEDICINE

## 2024-04-29 PROCEDURE — G8427 DOCREV CUR MEDS BY ELIG CLIN: HCPCS | Performed by: INTERNAL MEDICINE

## 2024-04-29 PROCEDURE — 1123F ACP DISCUSS/DSCN MKR DOCD: CPT | Performed by: INTERNAL MEDICINE

## 2024-04-29 PROCEDURE — 1090F PRES/ABSN URINE INCON ASSESS: CPT | Performed by: INTERNAL MEDICINE

## 2024-04-29 PROCEDURE — G8417 CALC BMI ABV UP PARAM F/U: HCPCS | Performed by: INTERNAL MEDICINE
